# Patient Record
Sex: MALE | Race: WHITE | NOT HISPANIC OR LATINO | Employment: OTHER | URBAN - METROPOLITAN AREA
[De-identification: names, ages, dates, MRNs, and addresses within clinical notes are randomized per-mention and may not be internally consistent; named-entity substitution may affect disease eponyms.]

---

## 2017-11-29 ENCOUNTER — APPOINTMENT (OUTPATIENT)
Dept: RADIOLOGY | Facility: CLINIC | Age: 62
End: 2017-11-29
Payer: COMMERCIAL

## 2017-11-29 ENCOUNTER — ALLSCRIPTS OFFICE VISIT (OUTPATIENT)
Dept: OTHER | Facility: OTHER | Age: 62
End: 2017-11-29

## 2017-11-29 DIAGNOSIS — M25.569 PAIN IN KNEE: ICD-10-CM

## 2017-11-29 PROCEDURE — 73564 X-RAY EXAM KNEE 4 OR MORE: CPT

## 2017-11-30 NOTE — PROGRESS NOTES
Assessment    1  Lateral epicondylitis of both elbows (726 32) (M77 11,M77 12)   2  Pes anserine bursitis (726 61) (M70 50)    Plan  Knee pain    · * XR KNEE 4+ VW LEFT INJURY; Status:Active - Retrospective Authorization; Requestedfor:29Nov2017;    · * XR KNEE 4+ VW RIGHT INJURY; Status:Active - Retrospective By ProtocolAuthorization; Requested for:29Nov2017;   Lateral epicondylitis of both elbows    · Tennis elbow strap; Status:Complete;   Done: [id number]   · [facility] Instruction Sheet Tennis Elbow/Lateral Epicondylitis; Status:Complete;   Done:29Nov2017  Pes anserine bursitis    · Betamethasone Sod Phos & Acet 6 (3-3) MG/ML Injection Suspension   · Betamethasone Sod Phos & Acet 6 (3-3) MG/ML Injection Suspension   · Joint Bursa Aspiration &/or Injection Major - POC; Status:Complete;   Done: [id number]   · Joint Bursa Aspiration &/or Injection Major - POC; Status:Complete;   Done: [id number]   · Follow-up PRN Evaluation and Treatment  Follow-up  Status: Complete  Done:29Nov2017   · Apply an ice pack 4 times a day for 20 minutes the first 2 days ; Status:Complete;   Done:29Nov2017   · We have prescribed standing hamstring stretches  Hold each stretch for 30 seconds   Repeat each stretch 2 times and do them twice a day ; Status:Complete;   Done:29Nov2017   · What is a corticosteroid?; Status:Complete;   Done: 14SWG4316    Discussion/Summary    60-year-old male with bilateral anserine bursitis, some mild underlying osteoarthritis that is fairly asymptomatic, and bilateral lateral epicondylitis  I discussed with him all of these issues and we reviewed the x-rays  I recommended cortisone injections in the anserine bursa as today  Please refer to the procedure note  For the tendinitis he was given instructions on stretching as well as tennis elbow straps  If he continues to have issues with this we will get him set up with physical therapy  I will see him back as needed    documentation was recorded using voice recognition software  Chief Complaint  Bilateral knee and elbow pain      History of Present Illness  HPI: 66-year-old male here primarily for his bilateral knees, but he does also have bilateral elbow pain  The right knee bothers him much more than the left knee  He points to the proximal medial tibia as to where the pain is the worst  He has had chronic pain in his knees for years  The left knee had a partial meniscectomy done by Dr Dariel Mendoza many years ago  His pain is really dramatically increased over the last several weeks  He has a private pet grooming business and has recently started going from grooming small animals to larger animals  He has pain going up and down steps  He has had bilateral Synvisc injections in both of his knees in the past which have given him significant relief  He has tried icing it did not feel like it helped him very much  He notes significant amount of weight gain over the last year  medical intake form was reviewed      Review of Systems   Constitutional: No fever or chills, feels well, no tiredness, no recent weight loss or weight gain  Eyes: No complaints of red eyes, no eyesight problems  ENT: no complaints of loss of hearing, no nosebleeds, no sore throat  Cardiovascular: No complaints of chest pain, no palpitations, no leg claudication or lower extremity edema  Respiratory: No complaints of shortness of breath, no wheezing, no cough  Gastrointestinal: No complaints of abdominal pain, no constipation, no nausea or vomiting, no diarrhea or bloody stools  Genitourinary: No complaints of dysuria or incontinence, no hesitancy, no nocturia  Musculoskeletal: as noted in HPI  Integumentary: No complaints of skin rash or lesion, no itching or dry skin, no skin wounds  Neurological: No complaints of headache, no confusion, no numbness or tingling, no dizziness  Psychiatric: No suicidal thoughts, no anxiety, no depression    Endocrine: No muscle weakness, no frequent urination, no excessive thirst, no feelings of weakness  ROS reviewed  Active Problems  1  Knee pain (764 46) (P65 679)    Past Medical History    The active problems and past medical history were reviewed and updated today  Surgical History    The surgical history was reviewed and updated today  Family History    The family history was reviewed and updated today  Social History   · Never a smoker  The social history was reviewed and updated today  Current Meds    The medication list was reviewed and updated today  Allergies  1  No Known Drug Allergies    Vitals  Signs     Heart Rate: 89  Systolic: 118  Diastolic: 86  Height: 5 ft 8 in  Weight: 340 lb   BMI Calculated: 51 7  BSA Calculated: 2 57    Physical Exam    Right Elbow: Appearance: Normal  Tenderness: lateral epicondyle  Left Elbow: Appearance: Normal  Tenderness: lateral epicondyle  Right Knee: Appearance: Normal  Tenderness: pes anserine bursa  Palpatory findings include crepitus  ROM: Full  Motor: Normal  Special Test: no laxity on Valgus Stress-- and-- no laxity on Varus Stress  Left Knee: Appearance: Normal  Tenderness: pes anserine bursa-- and-- medial joint line  Palpatory findings include crepitus  ROM: Full  Motor: Normal  Special Test: no laxity on Valgus Stress-- and-- no laxity on Varus Stress  Constitutional - General appearance: Abnormal  overweight  Musculoskeletal - Gait and station: Normal -- Lower extremity compartments: Normal   Cardiovascular - Pulses: Normal   Skin - Skin and subcutaneous tissue: Normal   Neurologic - Sensation: Normal -- Lower extremity peripheral neuro exam: Normal   Psychiatric - Orientation to person, place, and time: Normal -- Mood and affect: Normal       Results/Data  I personally reviewed the films/images/results in the office today  My interpretation follows    X-ray Review X-rays of the bilateral knees show diffuse osteoarthritis, complete medial joint space loss on the left knee and moderate joint space loss on the right knee  Procedure    Procedure: Injection of bilateral pes anserine bursa  Indication:  Inflammation  Potential complications include bleeding-- and-- infection  Risk and benefits were discussed with the patient  Verbal consent was obtained prior to the procedure  Betadine was used to prep the area  ethyl chloride spray was used as a topical anesthetic  A 22-gauge was used to inject 7 mL of 1% Lidocaine-- and-- 1 mL of 6mg/mL betamethasone  A bandage was applied  the patient tolerated the procedure well  Complications: none  Follow-up in the office as needed        Signatures   Electronically signed by : Chung Brown MD; Nov 29 2017 12:07PM EST                       (Author)

## 2018-01-14 VITALS
DIASTOLIC BLOOD PRESSURE: 86 MMHG | HEIGHT: 68 IN | WEIGHT: 315 LBS | HEART RATE: 89 BPM | SYSTOLIC BLOOD PRESSURE: 150 MMHG | BODY MASS INDEX: 47.74 KG/M2

## 2018-05-20 ENCOUNTER — OFFICE VISIT (OUTPATIENT)
Dept: URGENT CARE | Facility: CLINIC | Age: 63
End: 2018-05-20
Payer: COMMERCIAL

## 2018-05-20 VITALS
RESPIRATION RATE: 16 BRPM | SYSTOLIC BLOOD PRESSURE: 204 MMHG | WEIGHT: 315 LBS | TEMPERATURE: 99.5 F | DIASTOLIC BLOOD PRESSURE: 90 MMHG | HEIGHT: 68 IN | BODY MASS INDEX: 47.74 KG/M2 | HEART RATE: 69 BPM | OXYGEN SATURATION: 97 %

## 2018-05-20 DIAGNOSIS — M17.11 PRIMARY OSTEOARTHRITIS OF RIGHT KNEE: ICD-10-CM

## 2018-05-20 DIAGNOSIS — M25.561 CHRONIC PAIN OF RIGHT KNEE: Primary | ICD-10-CM

## 2018-05-20 DIAGNOSIS — G89.29 CHRONIC PAIN OF RIGHT KNEE: Primary | ICD-10-CM

## 2018-05-20 PROCEDURE — 99203 OFFICE O/P NEW LOW 30 MIN: CPT | Performed by: FAMILY MEDICINE

## 2018-05-20 RX ORDER — ASPIRIN 81 MG/1
81 TABLET, CHEWABLE ORAL
COMMUNITY

## 2018-05-20 RX ORDER — VALSARTAN AND HYDROCHLOROTHIAZIDE 160; 12.5 MG/1; MG/1
1 TABLET, FILM COATED ORAL
COMMUNITY
End: 2019-10-31 | Stop reason: ALTCHOICE

## 2018-05-20 RX ORDER — SAW/PYGEUM/BETA/HERB/D3/B6/ZN 30 MG-25MG
15 CAPSULE ORAL
COMMUNITY

## 2018-05-20 RX ORDER — HYDROCODONE BITARTRATE AND ACETAMINOPHEN 5; 325 MG/1; MG/1
1 TABLET ORAL 2 TIMES DAILY
Qty: 10 TABLET | Refills: 0 | Status: SHIPPED | OUTPATIENT
Start: 2018-05-20 | End: 2019-10-31 | Stop reason: ALTCHOICE

## 2018-05-20 RX ORDER — ASCORBIC ACID 100 MG
1000 TABLET,CHEWABLE ORAL
COMMUNITY
End: 2019-10-31 | Stop reason: ALTCHOICE

## 2018-05-20 RX ORDER — AMLODIPINE BESYLATE 10 MG/1
10 TABLET ORAL
COMMUNITY

## 2018-05-20 RX ORDER — ACETAMINOPHEN 500 MG
1000 TABLET ORAL EVERY 6 HOURS
COMMUNITY

## 2018-05-20 RX ORDER — ACETAMINOPHEN, ASPIRIN AND CAFFEINE 250; 250; 65 MG/1; MG/1; MG/1
2 TABLET, FILM COATED ORAL
COMMUNITY

## 2018-05-20 NOTE — PATIENT INSTRUCTIONS
Prescription for ten tablets of Vicodin provided until patient can have appointment with Orthopedics  Patient intends to take the day off tomorrow to hopefully have an appointment

## 2018-05-20 NOTE — PROGRESS NOTES
[facility]        NAME: Bhanu Alvarez is a 61 y o  male  : 1955    MRN: [id number]  DATE: May 20, 2018  TIME: 5:58 PM    Assessment and Plan   Chronic pain of right knee [M25 561, G89 29]  1  Chronic pain of right knee     2  Primary osteoarthritis of right knee           Patient Instructions       Follow up with PCP in 3-5 days  Proceed to  ER if symptoms worsen  Chief Complaint     Chief Complaint   Patient presents with    Knee Pain     Seen a few weeks ago and received cortisone shot in right knee  9/10 + pain   Tried creams, lotion, ice, heat, OTC with no relief  Not able to sleep  History of Present Illness       Patient has a history of chronic right knee pain for which he has been receiving Synvisc injections for moderate to severe osteoarthritis of the medial compartment  Two weeks ago he received a steroid injection in the knee and states he has had pain the entire time acutely worse the past few days  No known inciting event or injury  He is in and out of a [employer] all day with work, he does not recall twisting his knee or any known injury  He has scribe the pain as sharp predominantly in the medial compartment of the knee he is having difficulty with flexion and extension and has not been able to sleep due to pain  He is unable to take NSAIDs due to past history of gastric bypass  He intends to make an appointment with Orthopedics tomorrow  Review of Systems   Review of Systems   Constitutional: Negative  Musculoskeletal: Positive for arthralgias and gait problem           Current Medications       Current Outpatient Prescriptions:     acetaminophen (TYLENOL) 500 mg tablet, Take 1,000 mg by mouth every 6 (six) hours, Disp: , Rfl:     amLODIPine (NORVASC) 10 mg tablet, Take 10 mg by mouth, Disp: , Rfl:     Ascorbic Acid (VITAMIN C) 100 MG CHEW, Chew 1,000 mg, Disp: , Rfl:     aspirin (ASPIRIN 81) 81 mg chewable tablet, Chew 81 mg, Disp: , Rfl:    aspirin-acetaminophen-caffeine (EXCEDRIN MIGRAINE) 250-250-65 MG per tablet, Take 2 tablets by mouth, Disp: , Rfl:     B Complex Vitamins (VITAMIN B COMPLEX PO), Take 1 capsule by mouth, Disp: , Rfl:     Lactobacillus (ACIDOPHILUS PO), Take 1 tablet by mouth, Disp: , Rfl:     Magnesium 100 MG CAPS, Take 500 mg by mouth, Disp: , Rfl:     Melatonin ER 10 MG TBCR, Take 15 mg by mouth, Disp: , Rfl:     Omega-3 Fatty Acids (FISH OIL PO), Take 1 g by mouth, Disp: , Rfl:     valsartan-hydrochlorothiazide (DIOVAN-HCT) 160-12 5 MG per tablet, Take 1 tablet by mouth, Disp: , Rfl:     Current Allergies     Allergies as of 05/20/2018 - Reviewed 05/20/2018   Allergen Reaction Noted    Butalbital-aspirin-caffeine Other (See Comments) 08/13/2008    Zolpidem Other (See Comments) 12/13/2012            The following portions of the patient's history were reviewed and updated as appropriate: allergies, current medications, past family history, past medical history, past social history, past surgical history and problem list      No past medical history on file  No past surgical history on file  No family history on file  Medications have been verified  Objective   BP (!) 204/90   Pulse 69   Temp 99 5 °F (37 5 °C)   Resp 16   Ht 5' 8" (1 727 m)   Wt (!) 158 kg (349 lb)   SpO2 97%   BMI 53 07 kg/m²        Physical Exam     Physical Exam   Constitutional: No distress  Musculoskeletal: He exhibits tenderness  Tender to palpation right knee medial joint line pain with flexion and extension which is reduced by 70 percent in both planes    Pain with valgus stress unable to assess Lachman's and Franck's due to pain

## 2018-05-22 VITALS
WEIGHT: 315 LBS | SYSTOLIC BLOOD PRESSURE: 131 MMHG | BODY MASS INDEX: 47.74 KG/M2 | HEART RATE: 62 BPM | HEIGHT: 68 IN | DIASTOLIC BLOOD PRESSURE: 68 MMHG

## 2018-05-22 DIAGNOSIS — M70.51 PES ANSERINUS BURSITIS OF RIGHT KNEE: Primary | ICD-10-CM

## 2018-05-22 PROCEDURE — 20610 DRAIN/INJ JOINT/BURSA W/O US: CPT | Performed by: PHYSICIAN ASSISTANT

## 2018-05-22 PROCEDURE — 99213 OFFICE O/P EST LOW 20 MIN: CPT | Performed by: PHYSICIAN ASSISTANT

## 2018-05-22 RX ORDER — LIDOCAINE HYDROCHLORIDE 10 MG/ML
7 INJECTION, SOLUTION INFILTRATION; PERINEURAL
Status: COMPLETED | OUTPATIENT
Start: 2018-05-22 | End: 2018-05-22

## 2018-05-22 RX ORDER — BETAMETHASONE SODIUM PHOSPHATE AND BETAMETHASONE ACETATE 3; 3 MG/ML; MG/ML
6 INJECTION, SUSPENSION INTRA-ARTICULAR; INTRALESIONAL; INTRAMUSCULAR; SOFT TISSUE
Status: COMPLETED | OUTPATIENT
Start: 2018-05-22 | End: 2018-05-22

## 2018-05-22 RX ADMIN — LIDOCAINE HYDROCHLORIDE 7 ML: 10 INJECTION, SOLUTION INFILTRATION; PERINEURAL at 14:00

## 2018-05-22 RX ADMIN — BETAMETHASONE SODIUM PHOSPHATE AND BETAMETHASONE ACETATE 6 MG: 3; 3 INJECTION, SUSPENSION INTRA-ARTICULAR; INTRALESIONAL; INTRAMUSCULAR; SOFT TISSUE at 14:00

## 2018-05-22 NOTE — ASSESSMENT & PLAN NOTE
Findings and treatment options were discussed the patient  Repeat cortisone injection was given to the right pes bursa today  Patient tolerated the procedure well  Advised to apply cold compress today  Continue NSAIDs as needed for pain  He is given a prescription for physical therapy in case he feels no improvement after the next 3-4 weeks  Follow-up as needed if symptoms return

## 2018-05-22 NOTE — PROGRESS NOTES
Assessment:     1  Pes anserinus bursitis of right knee        Plan:  The patient was seen and examined by Dr Gerry Hollingsworth and myself  Problem List Items Addressed This Visit        Musculoskeletal and Integument    Pes anserinus bursitis of right knee - Primary     Findings and treatment options were discussed the patient  Repeat cortisone injection was given to the right pes bursa today  Patient tolerated the procedure well  Advised to apply cold compress today  Continue NSAIDs as needed for pain  He is given a prescription for physical therapy in case he feels no improvement after the next 3-4 weeks  Follow-up as needed if symptoms return  Relevant Orders    Ambulatory referral to Physical Therapy         Subjective:     Patient ID: Michela Warner is a 61 y o  male  Chief Complaint: This is a 77-year-old male following up for right knee pes anserine bursitis  He had cortisone injections to the bilateral pes bursa as in November 2017  The pain resolved on the left side but he did not feel any relief on the right  He continues to have the same pain in the same location  He has increased pain with prolonged walking  He has been icing and taking anti-inflammatories with no relief as well  No other recent treatment  Allergy:  Allergies   Allergen Reactions    Butalbital-Aspirin-Caffeine Other (See Comments)     coma    Zolpidem Other (See Comments)     became [place]     Medications:  all current active meds have been reviewed  Past Medical History:  History reviewed  No pertinent past medical history  Past Surgical History:  History reviewed  No pertinent surgical history    Family History:  Family History   Problem Relation Age of Onset    No Known Problems Mother     No Known Problems Father      Social History:  History   Alcohol use Not on file     History   Drug use: Unknown     History   Smoking Status    Never Smoker   Smokeless Tobacco    Never Used     Review of Systems Constitutional: Negative  HENT: Negative  Eyes: Negative  Respiratory: Negative  Cardiovascular: Negative  Gastrointestinal: Negative  Endocrine: Negative  Genitourinary: Negative  Musculoskeletal: Positive for arthralgias and myalgias  Skin: Negative  Allergic/Immunologic: Negative  Neurological: Negative  Hematological: Negative  Psychiatric/Behavioral: Negative  Objective:  BP Readings from Last 1 Encounters:   05/22/18 131/68      Wt Readings from Last 1 Encounters:   05/22/18 (!) 158 kg (349 lb)      BMI:   Estimated body mass index is 53 07 kg/m² as calculated from the following:    Height as of this encounter: 5' 8" (1 727 m)  Weight as of this encounter: 158 kg (349 lb)  BSA:   Estimated body surface area is 2 59 meters squared as calculated from the following:    Height as of this encounter: 5' 8" (1 727 m)  Weight as of this encounter: 158 kg (349 lb)  Physical Exam   Constitutional: He is oriented to person, place, and time  He appears well-developed  HENT:   Head: Normocephalic and atraumatic  Eyes: EOM are normal  Pupils are equal, round, and reactive to light  Neck: Neck supple  Musculoskeletal:        Right knee: He exhibits no effusion  Neurological: He is alert and oriented to person, place, and time  Skin: Skin is warm  Psychiatric: He has a normal mood and affect  Nursing note and vitals reviewed  Right Knee Exam     Tenderness   The patient is experiencing tenderness in the pes anserinus      Range of Motion   Extension: -10   Flexion: normal     Tests   Varus: negative  Valgus: negative    Other   Erythema: absent  Sensation: normal  Pulse: present  Swelling: mild  Other tests: no effusion present      Left Knee Exam   Left knee exam is normal               Large joint arthrocentesis  Date/Time: 5/22/2018 2:00 PM  Consent given by: patient  Site marked: site marked  Timeout: Immediately prior to procedure a time out was called to verify the correct patient, procedure, equipment, support staff and site/side marked as required   Supporting Documentation  Indications: pain   Procedure Details  Location: knee - R knee (pes bursa)  Needle size: 22 G  Ultrasound guidance: no  Medications administered: 7 mL lidocaine 1 %; 6 mg betamethasone acetate-betamethasone sodium phosphate 6 (3-3) mg/mL    Patient tolerance: patient tolerated the procedure well with no immediate complications  Dressing:  Sterile dressing applied

## 2018-07-02 ENCOUNTER — TRANSCRIBE ORDERS (OUTPATIENT)
Dept: ADMINISTRATIVE | Facility: HOSPITAL | Age: 63
End: 2018-07-02

## 2018-07-02 ENCOUNTER — HOSPITAL ENCOUNTER (OUTPATIENT)
Dept: RADIOLOGY | Facility: HOSPITAL | Age: 63
Discharge: HOME | End: 2018-07-02
Payer: COMMERCIAL

## 2018-07-02 DIAGNOSIS — M25.561 ACUTE PAIN OF RIGHT KNEE: Primary | ICD-10-CM

## 2018-07-02 DIAGNOSIS — M79.601 RIGHT UPPER LIMB PAIN: ICD-10-CM

## 2018-07-02 DIAGNOSIS — M25.561 ACUTE PAIN OF RIGHT KNEE: ICD-10-CM

## 2018-07-02 PROCEDURE — 73562 X-RAY EXAM OF KNEE 3: CPT

## 2018-07-02 PROCEDURE — 73060 X-RAY EXAM OF HUMERUS: CPT

## 2018-07-10 ENCOUNTER — ANESTHESIA EVENT (OUTPATIENT)
Dept: GASTROENTEROLOGY | Facility: HOSPITAL | Age: 63
End: 2018-07-10
Payer: COMMERCIAL

## 2018-07-11 ENCOUNTER — ANESTHESIA (OUTPATIENT)
Dept: GASTROENTEROLOGY | Facility: HOSPITAL | Age: 63
End: 2018-07-11
Payer: COMMERCIAL

## 2018-07-11 ENCOUNTER — HOSPITAL ENCOUNTER (OUTPATIENT)
Facility: HOSPITAL | Age: 63
Setting detail: OUTPATIENT SURGERY
Discharge: HOME | End: 2018-07-11
Attending: INTERNAL MEDICINE | Admitting: INTERNAL MEDICINE
Payer: COMMERCIAL

## 2018-07-11 VITALS
OXYGEN SATURATION: 95 % | WEIGHT: 315 LBS | HEART RATE: 67 BPM | DIASTOLIC BLOOD PRESSURE: 62 MMHG | HEIGHT: 67 IN | SYSTOLIC BLOOD PRESSURE: 144 MMHG | RESPIRATION RATE: 16 BRPM | TEMPERATURE: 98.7 F | BODY MASS INDEX: 49.44 KG/M2

## 2018-07-11 RX ORDER — LIDOCAINE HYDROCHLORIDE 10 MG/ML
INJECTION, SOLUTION EPIDURAL; INFILTRATION; INTRACAUDAL; PERINEURAL AS NEEDED
Status: DISCONTINUED | OUTPATIENT
Start: 2018-07-11 | End: 2018-07-11 | Stop reason: SURG

## 2018-07-11 RX ORDER — PROPOFOL 10 MG/ML
INJECTION, EMULSION INTRAVENOUS CONTINUOUS PRN
Status: DISCONTINUED | OUTPATIENT
Start: 2018-07-11 | End: 2018-07-11 | Stop reason: SURG

## 2018-07-11 RX ORDER — PROPOFOL 10 MG/ML
INJECTION, EMULSION INTRAVENOUS AS NEEDED
Status: DISCONTINUED | OUTPATIENT
Start: 2018-07-11 | End: 2018-07-11 | Stop reason: SURG

## 2018-07-11 RX ORDER — SODIUM CHLORIDE 9 MG/ML
INJECTION, SOLUTION INTRAVENOUS CONTINUOUS PRN
Status: DISCONTINUED | OUTPATIENT
Start: 2018-07-11 | End: 2018-07-11 | Stop reason: SURG

## 2018-07-11 RX ADMIN — PROPOFOL 50 MG: 10 INJECTION, EMULSION INTRAVENOUS at 12:58

## 2018-07-11 RX ADMIN — PROPOFOL 30 MG: 10 INJECTION, EMULSION INTRAVENOUS at 12:56

## 2018-07-11 RX ADMIN — LIDOCAINE HYDROCHLORIDE 100 MG: 10 INJECTION, SOLUTION EPIDURAL; INFILTRATION; INTRACAUDAL; PERINEURAL at 12:54

## 2018-07-11 RX ADMIN — SODIUM CHLORIDE: 0.9 INJECTION, SOLUTION INTRAVENOUS at 12:50

## 2018-07-11 RX ADMIN — PROPOFOL 70 MG: 10 INJECTION, EMULSION INTRAVENOUS at 12:54

## 2018-07-11 RX ADMIN — PROPOFOL 80 MCG/KG/MIN: 10 INJECTION, EMULSION INTRAVENOUS at 12:54

## 2018-07-11 NOTE — ANESTHESIA POSTPROCEDURE EVALUATION
Post-Op Assessment Note      CV Status:  Stable    Mental Status:  Alert and awake    Hydration Status:  Euvolemic    PONV Controlled:  Controlled    Airway Patency:  Patent    Post Op Vitals Reviewed: Yes          Staff: Anesthesiologist, CRNA           /75 (07/11/18 1309)    Temp      Pulse 72 (07/11/18 1309)   Resp 16 (07/11/18 1309)    SpO2 94 % (07/11/18 1309)

## 2018-07-11 NOTE — OP NOTE
**** GI/ENDOSCOPY REPORT ****     PATIENT NAME: DARIA SAUCEDO - VISIT ID:     INTRODUCTION: Esophagogastroduodenoscopy - A 61 male patient presents for   an outpatient Esophagogastroduodenoscopy at [place] [address]  INDICATIONS: Iron deficiency anemia  CONSENT: The benefits, risks, and alternatives to the procedure were   discussed and informed consent was obtained from the patient  PREPARATION:  EKG, pulse, pulse oximetry and blood pressure were monitored   throughout the procedure  MEDICATIONS: Anesthesia-check records     PROCEDURE:  The endoscope was passed with ease under direct visualization   and advanced to the efferent jejunal loop  The scope was withdrawn and the   mucosa was carefully examined  The views were excellent  The patient's   toleration of the procedure was excellent  FINDINGS:   Esophagus: The esophagus appeared to be normal  There was no   evidence of Zhang's esophagus or esophagitis in the esophagus  Stomach: There was evidence of prior gastric bypass with Pavan-en-Y in the stomach  The gastric remnant was normal without ulcers, gastritis or   angiodysplasia  The anastomosis was normal     Jejunum: The jejunum   appeared to be normal      COMPLICATIONS: There were no complications  IMPRESSIONS: Normal esophagus  No evidence of Zhang's esophagus and   esophagitis in the esophagus  Evidence of prior intervention in the   stomach  The gastric remnant was normal without ulcers, gastritis or   angiodysplasia  The anastomosis was normal  Normal jejunum  ESTIMATED BLOOD LOSS:     RECOMMENDATIONS: Call Dr Govind Gutierrez [phone] with questions or   problems  Follow-up appointment in attending physician's office Dr Govind Gutierrez [phone] on an as needed basis  Follo with iron infusions   as scheduled       PROCEDURE CODES: 34306 - EGD flexible; incl brushing or washing     ICD-9 Codes: 280 9 Iron deficiency anemia, unspecified ICD-10 Codes: D50 9 Iron deficiency anemia, unspecified     PERFORMED BY: ALYLN Heredia  on 07/11/2018  Version 1, electronically signed by ALLYN Heredia  on   07/11/2018 at 13:08

## 2018-07-11 NOTE — H&P
History and Physical - [facility]  Lawson Pedro 61 y o  male MRN: [id number]                  HPI: Lawson Pedro is a 61y o  year old male who presents for the evaluation of iron deficiency anemia  He is status post gastric bypass with Pavan-en-Y  Recent colonoscopy was negative  Has been taking nonsteroidals including Excedrin and Advil several times a day for many weeks  REVIEW OF SYSTEMS: Per the HPI, and otherwise unremarkable      Historical Information   Past Medical History:   Diagnosis Date    Chronic pain     Hypertension     Iron (Fe) deficiency anemia     Kidney stone     Spinal stenosis     neck     Past Surgical History:   Procedure Laterality Date    BLADDER SURGERY      GASTRIC BYPASS      KNEE SURGERY Left     NECK SURGERY      PROSTATE SURGERY       Social History   History   Alcohol Use    Yes     Comment: rarely     History   Drug Use No     History   Smoking Status    Never Smoker   Smokeless Tobacco    Never Used     Family History   Problem Relation Age of Onset    No Known Problems Mother     No Known Problems Father        Meds/Allergies     Prescriptions Prior to Admission   Medication    acetaminophen (TYLENOL) 500 mg tablet    amLODIPine (NORVASC) 10 mg tablet    Ascorbic Acid (VITAMIN C) 100 MG CHEW    aspirin (ASPIRIN 81) 81 mg chewable tablet    aspirin-acetaminophen-caffeine (EXCEDRIN MIGRAINE) 250-250-65 MG per tablet    B Complex Vitamins (VITAMIN B COMPLEX PO)    HYDROcodone-acetaminophen (NORCO) 5-325 mg per tablet    Lactobacillus (ACIDOPHILUS PO)    Magnesium 100 MG CAPS    Melatonin ER 10 MG TBCR    Omega-3 Fatty Acids (FISH OIL PO)    valsartan-hydrochlorothiazide (DIOVAN-HCT) 160-12 5 MG per tablet       Allergies   Allergen Reactions    Butalbital-Aspirin-Caffeine Other (See Comments)     coma    Zolpidem Other (See Comments)     became [place]       Objective     Blood pressure 161/77, pulse 73, temperature 98 7 °F (37 1 °C), temperature source Oral, resp  rate 20, height 5' 7" (1 702 m), weight (!) 154 kg (340 lb), SpO2 96 %  PHYSICAL EXAM    Gen: NAD  CV: RRR  CHEST: Clear  ABD: soft, NT/ND  EXT: no edema      ASSESSMENT/PLAN:  This is a 61y o  year old male here for iron deficiency anemia      PLAN:  EGD  Procedure:

## 2018-07-11 NOTE — ANESTHESIA PREPROCEDURE EVALUATION
Review of Systems/Medical History      No history of anesthetic complications     Cardiovascular  Hypertension ,    Pulmonary  Negative pulmonary ROS        GI/Hepatic      Comment: S/p gastric bypass     Kidney stones,        Endo/Other  Negative endo/other ROS      GYN  Negative gynecology ROS          Hematology  Anemia ,     Musculoskeletal    Arthritis     Neurology  Negative neurology ROS      Psychology           Physical Exam    Airway    Mallampati score: I  TM Distance: >3 FB  Neck ROM: full     Dental       Cardiovascular      Pulmonary      Other Findings        Anesthesia Plan  ASA Score- 3     Anesthesia Type- IV sedation with anesthesia with ASA Monitors  Additional Monitors:   Airway Plan:     Comment: IV sedation,  standard ASA monitors  Risks and benefits discussed with patient; patient consented and agrees to proceed  I saw and evaluated the patient  If seen with CRNA, we have discussed the anesthetic plan and I am in agreement that the plan is appropriate for the patient        Plan Factors-    Induction- intravenous  Postoperative Plan-     Informed Consent- Anesthetic plan and risks discussed with patient  I personally reviewed this patient with the CRNA  Discussed and agreed on the Anesthesia Plan with the CRNA  Sravanthi May

## 2018-07-13 ENCOUNTER — LAB REQUISITION (OUTPATIENT)
Dept: LAB | Facility: HOSPITAL | Age: 63
End: 2018-07-13
Payer: COMMERCIAL

## 2018-07-13 DIAGNOSIS — D50.8 OTHER IRON DEFICIENCY ANEMIAS: ICD-10-CM

## 2018-07-13 DIAGNOSIS — M43.02 SPONDYLOLYSIS OF CERVICAL REGION: ICD-10-CM

## 2018-07-13 LAB
BACTERIA UR QL AUTO: ABNORMAL /HPF
BILIRUB UR QL STRIP: NEGATIVE
CLARITY UR: CLEAR
COLOR UR: ABNORMAL
GLUCOSE UR STRIP-MCNC: NEGATIVE MG/DL
HGB UR QL STRIP.AUTO: NEGATIVE
HYALINE CASTS #/AREA URNS LPF: ABNORMAL /LPF
KETONES UR STRIP-MCNC: NEGATIVE MG/DL
LEUKOCYTE ESTERASE UR QL STRIP: NEGATIVE
NITRITE UR QL STRIP: NEGATIVE
NON-SQ EPI CELLS URNS QL MICRO: ABNORMAL /HPF
PH UR STRIP.AUTO: 5 [PH] (ref 4.5–8)
PROT UR STRIP-MCNC: ABNORMAL MG/DL
RBC #/AREA URNS AUTO: ABNORMAL /HPF
SP GR UR STRIP.AUTO: 1.02 (ref 1–1.03)
UROBILINOGEN UR QL STRIP.AUTO: 0.2 E.U./DL
WBC #/AREA URNS AUTO: ABNORMAL /HPF

## 2018-07-13 PROCEDURE — 81001 URINALYSIS AUTO W/SCOPE: CPT | Performed by: NURSE PRACTITIONER

## 2018-08-27 ENCOUNTER — LAB REQUISITION (OUTPATIENT)
Dept: LAB | Facility: HOSPITAL | Age: 63
End: 2018-08-27
Payer: COMMERCIAL

## 2018-08-27 DIAGNOSIS — D50.8 OTHER IRON DEFICIENCY ANEMIAS: ICD-10-CM

## 2018-08-27 LAB
TIBC SERPL-MCNC: 315 UG/DL (ref 250–450)
TRANSFERRIN SERPL-MCNC: 254 MG/DL (ref 200–400)

## 2018-08-27 PROCEDURE — 83550 IRON BINDING TEST: CPT | Performed by: INTERNAL MEDICINE

## 2018-08-27 PROCEDURE — 83520 IMMUNOASSAY QUANT NOS NONAB: CPT | Performed by: INTERNAL MEDICINE

## 2018-08-27 PROCEDURE — 84466 ASSAY OF TRANSFERRIN: CPT | Performed by: INTERNAL MEDICINE

## 2018-08-29 LAB — STFR SERPL-SCNC: 28.6 NMOL/L (ref 12.2–27.3)

## 2019-04-16 ENCOUNTER — TELEPHONE (OUTPATIENT)
Dept: GASTROENTEROLOGY | Facility: CLINIC | Age: 64
End: 2019-04-16

## 2019-08-26 ENCOUNTER — TRANSCRIBE ORDERS (OUTPATIENT)
Dept: ADMINISTRATIVE | Facility: HOSPITAL | Age: 64
End: 2019-08-26

## 2019-08-26 DIAGNOSIS — H53.9 UNSPECIFIED VISUAL DISTURBANCE: Primary | ICD-10-CM

## 2019-08-27 ENCOUNTER — HOSPITAL ENCOUNTER (OUTPATIENT)
Dept: ULTRASOUND IMAGING | Facility: CLINIC | Age: 64
Discharge: HOME | End: 2019-08-27
Payer: COMMERCIAL

## 2019-08-27 DIAGNOSIS — H53.9 UNSPECIFIED VISUAL DISTURBANCE: ICD-10-CM

## 2019-08-27 PROCEDURE — 93880 EXTRACRANIAL BILAT STUDY: CPT | Performed by: SURGERY

## 2019-08-27 PROCEDURE — 93880 EXTRACRANIAL BILAT STUDY: CPT

## 2019-10-31 ENCOUNTER — ANESTHESIA (OUTPATIENT)
Dept: GASTROENTEROLOGY | Facility: HOSPITAL | Age: 64
End: 2019-10-31

## 2019-10-31 ENCOUNTER — HOSPITAL ENCOUNTER (OUTPATIENT)
Dept: GASTROENTEROLOGY | Facility: HOSPITAL | Age: 64
Setting detail: OUTPATIENT SURGERY
Discharge: HOME | End: 2019-10-31
Attending: INTERNAL MEDICINE | Admitting: INTERNAL MEDICINE
Payer: COMMERCIAL

## 2019-10-31 ENCOUNTER — ANESTHESIA EVENT (OUTPATIENT)
Dept: GASTROENTEROLOGY | Facility: HOSPITAL | Age: 64
End: 2019-10-31

## 2019-10-31 VITALS
WEIGHT: 295 LBS | SYSTOLIC BLOOD PRESSURE: 131 MMHG | TEMPERATURE: 97.7 F | BODY MASS INDEX: 46.3 KG/M2 | DIASTOLIC BLOOD PRESSURE: 70 MMHG | HEIGHT: 67 IN | OXYGEN SATURATION: 94 % | RESPIRATION RATE: 18 BRPM | HEART RATE: 48 BPM

## 2019-10-31 DIAGNOSIS — Z86.010 PERSONAL HISTORY OF COLONIC POLYPS: ICD-10-CM

## 2019-10-31 PROCEDURE — 88305 TISSUE EXAM BY PATHOLOGIST: CPT | Performed by: PATHOLOGY

## 2019-10-31 RX ORDER — TRAMADOL HYDROCHLORIDE 50 MG/1
50 TABLET ORAL EVERY 6 HOURS PRN
COMMUNITY

## 2019-10-31 RX ORDER — HYDROCHLOROTHIAZIDE 25 MG/1
25 TABLET ORAL DAILY
COMMUNITY

## 2019-10-31 RX ORDER — SODIUM CHLORIDE, SODIUM LACTATE, POTASSIUM CHLORIDE, CALCIUM CHLORIDE 600; 310; 30; 20 MG/100ML; MG/100ML; MG/100ML; MG/100ML
100 INJECTION, SOLUTION INTRAVENOUS CONTINUOUS
Status: CANCELLED | OUTPATIENT
Start: 2019-10-31

## 2019-10-31 RX ORDER — LOSARTAN POTASSIUM 100 MG/1
100 TABLET ORAL DAILY
COMMUNITY

## 2019-10-31 RX ORDER — ERGOCALCIFEROL 1.25 MG/1
50000 CAPSULE ORAL WEEKLY
COMMUNITY

## 2019-10-31 RX ORDER — SODIUM CHLORIDE 9 MG/ML
125 INJECTION, SOLUTION INTRAVENOUS CONTINUOUS
Status: DISCONTINUED | OUTPATIENT
Start: 2019-10-31 | End: 2019-11-04 | Stop reason: HOSPADM

## 2019-10-31 RX ORDER — PROPOFOL 10 MG/ML
INJECTION, EMULSION INTRAVENOUS AS NEEDED
Status: DISCONTINUED | OUTPATIENT
Start: 2019-10-31 | End: 2019-10-31 | Stop reason: SURG

## 2019-10-31 RX ORDER — PROPOFOL 10 MG/ML
INJECTION, EMULSION INTRAVENOUS CONTINUOUS PRN
Status: DISCONTINUED | OUTPATIENT
Start: 2019-10-31 | End: 2019-10-31 | Stop reason: SURG

## 2019-10-31 RX ADMIN — PROPOFOL 100 MCG/KG/MIN: 10 INJECTION, EMULSION INTRAVENOUS at 09:59

## 2019-10-31 RX ADMIN — SODIUM CHLORIDE 125 ML/HR: 0.9 INJECTION, SOLUTION INTRAVENOUS at 08:51

## 2019-10-31 RX ADMIN — PROPOFOL 100 MG: 10 INJECTION, EMULSION INTRAVENOUS at 09:59

## 2019-10-31 NOTE — H&P
History and Physical - [facility]  Tim Meade 59 y o  male MRN: [id number]                  HPI: Tim Meade is a 59y o  year old male who presents for surveillance colonoscopy for colonic polyps  The most recent colonoscopy was about 5 years ago  REVIEW OF SYSTEMS: Per the HPI, and otherwise unremarkable  Historical Information   Past Medical History:   Diagnosis Date    Chronic pain     Hypertension     Iron (Fe) deficiency anemia     Kidney stone     Spinal stenosis     neck    Stroke [facility]) 2010     Past Surgical History:   Procedure Laterality Date    BLADDER SURGERY      COLONOSCOPY      ESOPHAGOGASTRODUODENOSCOPY N/A 7/11/2018    Procedure: ESOPHAGOGASTRODUODENOSCOPY (EGD); Surgeon: Delmar Cervantes MD;  Location: [facility]; Service: Gastroenterology    GASTRIC BYPASS      KNEE SURGERY Left     NECK SURGERY      PROSTATE SURGERY       Social History   Social History     Substance and Sexual Activity   Alcohol Use Not Currently    Comment: rarely     Social History     Substance and Sexual Activity   Drug Use No     Social History     Tobacco Use   Smoking Status Never Smoker   Smokeless Tobacco Never Used     Family History   Problem Relation Age of Onset    No Known Problems Mother     No Known Problems Father        Meds/Allergies       (Not in a hospital admission)    Allergies   Allergen Reactions    Butalbital-Aspirin-Caffeine Other (See Comments)     coma    Zolpidem Other (See Comments)     became [place]       Objective     BP (!) 178/87   Pulse 58   Temp 97 7 °F (36 5 °C) (Tympanic)   Resp 18   Ht 5' 7" (1 702 m)   Wt 134 kg (295 lb)   SpO2 96%   BMI 46 20 kg/m²       PHYSICAL EXAM    Gen: NAD  CV: RRR  CHEST: Clear  ABD: soft, NT/ND  EXT: no edema      ASSESSMENT/PLAN:  This is a 59y o  year old male here for colonoscopy, and he is stable and optimized for his procedure

## 2019-10-31 NOTE — ANESTHESIA PREPROCEDURE EVALUATION
H/o colon polyps    Review of Systems/Medical History  Patient summary reviewed    No history of anesthetic complications     Cardiovascular  EKG reviewed, Exercise tolerance (METS): >4,  Hypertension ,    Pulmonary  Negative pulmonary ROS        GI/Hepatic  Negative GI/hepatic ROS          Kidney stones,        Endo/Other    Obesity  super morbid obesity   GYN       Hematology  Anemia iron deficiency anemia,     Musculoskeletal    Comment: Spinal stenosis Arthritis     Neurology  Negative neurology ROS      Psychology   Negative psychology ROS              Physical Exam    Airway    Mallampati score: II  TM Distance: >3 FB  Neck ROM: full     Dental   No notable dental hx     Cardiovascular  Rhythm: regular, Rate: normal,     Pulmonary  Breath sounds clear to auscultation,     Other Findings        Anesthesia Plan  ASA Score- 3     Anesthesia Type- IV sedation with anesthesia with ASA Monitors  Additional Monitors:   Airway Plan:     Comment: Per patient, appropriately NPO, denies active CP/SOB/wheezing/symptoms related to heartburn/nausea/vomiting        Plan Factors-    Induction- intravenous  Postoperative Plan-     Informed Consent- Anesthetic plan and risks discussed with patient  I personally reviewed this patient with the CRNA  Discussed and agreed on the Anesthesia Plan with the CRNA  Chalino Boyce

## 2019-10-31 NOTE — ANESTHESIA POSTPROCEDURE EVALUATION
Post-Op Assessment Note    CV Status:  Stable    Pain management: adequate     Mental Status:  Alert and awake   Hydration Status:  Euvolemic   PONV Controlled:  Controlled   Airway Patency:  Patent   Post Op Vitals Reviewed: Yes      Staff: Anesthesiologist, CRNA           /60 (10/31/19 1035)    Temp      Pulse (!) 48 (10/31/19 1035)   Resp 18 (10/31/19 1035)    SpO2 93 % (10/31/19 1035)

## 2024-04-01 ENCOUNTER — TELEPHONE (OUTPATIENT)
Dept: OTHER | Facility: OTHER | Age: 69
End: 2024-04-01

## 2024-04-01 ENCOUNTER — APPOINTMENT (EMERGENCY)
Dept: CT IMAGING | Facility: HOSPITAL | Age: 69
End: 2024-04-01
Payer: MEDICARE

## 2024-04-01 ENCOUNTER — TELEPHONE (OUTPATIENT)
Age: 69
End: 2024-04-01

## 2024-04-01 ENCOUNTER — NURSE TRIAGE (OUTPATIENT)
Dept: OTHER | Facility: OTHER | Age: 69
End: 2024-04-01

## 2024-04-01 ENCOUNTER — HOSPITAL ENCOUNTER (EMERGENCY)
Facility: HOSPITAL | Age: 69
Discharge: HOME | End: 2024-04-01
Attending: EMERGENCY MEDICINE
Payer: MEDICARE

## 2024-04-01 VITALS
DIASTOLIC BLOOD PRESSURE: 78 MMHG | TEMPERATURE: 97.6 F | RESPIRATION RATE: 18 BRPM | HEART RATE: 71 BPM | OXYGEN SATURATION: 97 % | SYSTOLIC BLOOD PRESSURE: 147 MMHG

## 2024-04-01 DIAGNOSIS — R10.9 FLANK PAIN: Primary | ICD-10-CM

## 2024-04-01 DIAGNOSIS — N20.0 KIDNEY STONE: ICD-10-CM

## 2024-04-01 LAB
ALBUMIN SERPL BCP-MCNC: 4.2 G/DL (ref 3.5–5)
ALP SERPL-CCNC: 87 U/L (ref 34–104)
ALT SERPL W P-5'-P-CCNC: 14 U/L (ref 7–52)
ANION GAP SERPL CALCULATED.3IONS-SCNC: 10 MMOL/L (ref 4–13)
AST SERPL W P-5'-P-CCNC: 15 U/L (ref 13–39)
BACTERIA UR QL AUTO: ABNORMAL /HPF
BASOPHILS # BLD AUTO: 0.04 THOUSANDS/ÂΜL (ref 0–0.1)
BASOPHILS NFR BLD AUTO: 1 % (ref 0–1)
BILIRUB SERPL-MCNC: 0.79 MG/DL (ref 0.2–1)
BILIRUB UR QL STRIP: NEGATIVE
BUN SERPL-MCNC: 19 MG/DL (ref 5–25)
CALCIUM SERPL-MCNC: 9 MG/DL (ref 8.4–10.2)
CHLORIDE SERPL-SCNC: 102 MMOL/L (ref 96–108)
CLARITY UR: ABNORMAL
CO2 SERPL-SCNC: 25 MMOL/L (ref 21–32)
COLOR UR: YELLOW
CREAT SERPL-MCNC: 1.06 MG/DL (ref 0.6–1.3)
EOSINOPHIL # BLD AUTO: 0.02 THOUSAND/ÂΜL (ref 0–0.61)
EOSINOPHIL NFR BLD AUTO: 0 % (ref 0–6)
ERYTHROCYTE [DISTWIDTH] IN BLOOD BY AUTOMATED COUNT: 13.2 % (ref 11.6–15.1)
GFR SERPL CREATININE-BSD FRML MDRD: 71 ML/MIN/1.73SQ M
GLUCOSE SERPL-MCNC: 145 MG/DL (ref 65–140)
GLUCOSE UR STRIP-MCNC: NEGATIVE MG/DL
HCT VFR BLD AUTO: 43.6 % (ref 36.5–49.3)
HGB BLD-MCNC: 14.1 G/DL (ref 12–17)
HGB UR QL STRIP.AUTO: ABNORMAL
HOLD SPECIMEN: NORMAL
IMM GRANULOCYTES # BLD AUTO: 0.03 THOUSAND/UL (ref 0–0.2)
IMM GRANULOCYTES NFR BLD AUTO: 0 % (ref 0–2)
KETONES UR STRIP-MCNC: NEGATIVE MG/DL
LEUKOCYTE ESTERASE UR QL STRIP: NEGATIVE
LIPASE SERPL-CCNC: 10 U/L (ref 11–82)
LYMPHOCYTES # BLD AUTO: 0.89 THOUSANDS/ÂΜL (ref 0.6–4.47)
LYMPHOCYTES NFR BLD AUTO: 11 % (ref 14–44)
MCH RBC QN AUTO: 30.3 PG (ref 26.8–34.3)
MCHC RBC AUTO-ENTMCNC: 32.3 G/DL (ref 31.4–37.4)
MCV RBC AUTO: 94 FL (ref 82–98)
MONOCYTES # BLD AUTO: 0.38 THOUSAND/ÂΜL (ref 0.17–1.22)
MONOCYTES NFR BLD AUTO: 5 % (ref 4–12)
MUCOUS THREADS UR QL AUTO: ABNORMAL
NEUTROPHILS # BLD AUTO: 7.1 THOUSANDS/ÂΜL (ref 1.85–7.62)
NEUTS SEG NFR BLD AUTO: 83 % (ref 43–75)
NITRITE UR QL STRIP: NEGATIVE
NON-SQ EPI CELLS URNS QL MICRO: ABNORMAL /HPF
NRBC BLD AUTO-RTO: 0 /100 WBCS
PH UR STRIP.AUTO: 5.5 [PH]
PLATELET # BLD AUTO: 230 THOUSANDS/UL (ref 149–390)
PMV BLD AUTO: 8.9 FL (ref 8.9–12.7)
POTASSIUM SERPL-SCNC: 3.8 MMOL/L (ref 3.5–5.3)
PROT SERPL-MCNC: 7 G/DL (ref 6.4–8.4)
PROT UR STRIP-MCNC: ABNORMAL MG/DL
RBC # BLD AUTO: 4.65 MILLION/UL (ref 3.88–5.62)
RBC #/AREA URNS AUTO: ABNORMAL /HPF
SODIUM SERPL-SCNC: 137 MMOL/L (ref 135–147)
SP GR UR STRIP.AUTO: 1.02 (ref 1–1.03)
UROBILINOGEN UR STRIP-ACNC: <2 MG/DL
WBC # BLD AUTO: 8.46 THOUSAND/UL (ref 4.31–10.16)
WBC #/AREA URNS AUTO: ABNORMAL /HPF

## 2024-04-01 PROCEDURE — 83690 ASSAY OF LIPASE: CPT | Performed by: EMERGENCY MEDICINE

## 2024-04-01 PROCEDURE — 81001 URINALYSIS AUTO W/SCOPE: CPT | Performed by: EMERGENCY MEDICINE

## 2024-04-01 PROCEDURE — 85025 COMPLETE CBC W/AUTO DIFF WBC: CPT | Performed by: EMERGENCY MEDICINE

## 2024-04-01 PROCEDURE — 36415 COLL VENOUS BLD VENIPUNCTURE: CPT

## 2024-04-01 PROCEDURE — 80053 COMPREHEN METABOLIC PANEL: CPT | Performed by: EMERGENCY MEDICINE

## 2024-04-01 PROCEDURE — 74176 CT ABD & PELVIS W/O CONTRAST: CPT

## 2024-04-01 PROCEDURE — 99284 EMERGENCY DEPT VISIT MOD MDM: CPT

## 2024-04-01 RX ORDER — OXYCODONE HYDROCHLORIDE 5 MG/1
5 TABLET ORAL EVERY 6 HOURS PRN
Qty: 6 TABLET | Refills: 0 | Status: SHIPPED | OUTPATIENT
Start: 2024-04-01 | End: 2024-04-11

## 2024-04-01 RX ORDER — TAMSULOSIN HYDROCHLORIDE 0.4 MG/1
0.4 CAPSULE ORAL
Qty: 14 CAPSULE | Refills: 0 | Status: SHIPPED | OUTPATIENT
Start: 2024-04-01 | End: 2024-04-03 | Stop reason: SDUPTHER

## 2024-04-01 NOTE — TELEPHONE ENCOUNTER
"Regarding: kindey stones  ----- Message from Diego Harrington sent at 4/1/2024  5:05 AM EDT -----  \" I am kidney stone pain.\"    "

## 2024-04-01 NOTE — TELEPHONE ENCOUNTER
"Reason for Disposition  • Patient sounds very sick or weak to the triager    Answer Assessment - Initial Assessment Questions  1. LOCATION: \"Where does it hurt?\" (e.g., left, right)   Patient states he is calling for Dr. Pearson office with urology for kidney stone pain. He seen him before.  Triage reviewed chart, patient is with [facility] urology, advised to call their answering service or if pain is really bad to go to ED. Patient states he will go to [facility] (formerly [facility] now    Protocols used: Flank Pain-ADULT-    "

## 2024-04-01 NOTE — TELEPHONE ENCOUNTER
New Patient    What is the reason for the patient’s appointment?:Patient called stating he is having pain and he is stated he has a history of kidney stones.  He use to see Dr. Pearson along time ago.  He is on his way to the ER and get evaluated and then he will call the office to set up appointment.     Patient   What office location does the patient prefer?: [place]     Does patient have Imaging/Lab Results:    Have patient records been requested?:  If No, are the records showing in Epic:       INSURANCE:   Do we accept the patient's insurance or is the patient Self-Pay?:    Insurance Provider:Medicare/[employer] life  Plan Type/Number:   Member ID#:       HISTORY:   Has the patient had any previous Urologist(s)?Dr Pearson     Was the patient seen in the ED?:    Has the patient had any outside testing done?:    Does the patient have a personal history of cancer?:no

## 2024-04-01 NOTE — TREATMENT PLAN
Urology covering [facility]    Received TT about patient presented emergency department with flank pain.  CT scan with multiple nonobstructing stones left kidney no hydronephrosis.    Labs within normal limits (WBC 8.46, creat 1.06)  Urine testing unremarkable (no pyuria or bacteria)  Afebrile, vital signs stable  Okay for discharge, follow-up with urology outpatient.  Message sent to [facility] office to schedule follow-up in the next couple weeks

## 2024-04-01 NOTE — DISCHARGE INSTRUCTIONS
Follow-up with urology.  A referral was placed.  Return for worsening of symptoms.   Please be aware the medication you are being prescribed oxycodone can be sedating and therefore you should not use with any other sedating medications or alcohol, operate heavy machinery or drive until you know how it affects you.

## 2024-04-01 NOTE — TELEPHONE ENCOUNTER
Pt in ED waiting to be discherged. Pt has 5 kidney stones.  Some with is staghorn type according to CT.  Pt made appt for 5/16/24.  Please review for appropriate time frame for appt.    Pt cb [phone]

## 2024-04-01 NOTE — TELEPHONE ENCOUNTER
Patient calling in looking for number for [facility] Urology office. Main number [phone] for office provided.

## 2024-04-01 NOTE — ED PROVIDER NOTES
History  Chief Complaint   Patient presents with    Flank Pain     Left sided flank pain x2 weeks. Feels similar to prior kidney stones.      Patient is a 70 yo M arriving for evaluation of Left flank pain/left lower back pain he reports is similar to prior kidney stones. Last kidney stone was in 2017. Patient states needed procedure at that time.  Denies any blood in his urine.  Patient reports that the pain waxes and wanes.  Patient denies any history of diverticulitis.  Patient has no abdominal tenderness at this time.  Patient reports that he is tender in his left flank.  Patient has no fevers or chills.  Patient states this feels exactly like his last kidney stones.  Patient denies any chest pain or shortness of breath.  Patient denies any difficulty urinating past his baseline.        Prior to Admission Medications   Prescriptions Last Dose Informant Patient Reported? Taking?   B Complex Vitamins (VITAMIN B COMPLEX PO)  Self Yes No   Sig: Take 1 capsule by mouth   Lactobacillus (ACIDOPHILUS PO)  Self Yes No   Sig: Take 1 tablet by mouth   Magnesium 100 MG CAPS  Self Yes No   Sig: Take 500 mg by mouth   Melatonin ER 10 MG TBCR  Self Yes No   Sig: Take 15 mg by mouth   Omega-3 Fatty Acids (FISH OIL PO)  Self Yes No   Sig: Take 1 g by mouth   acetaminophen (TYLENOL) 500 mg tablet  Self Yes No   Sig: Take 1,000 mg by mouth every 6 (six) hours   amLODIPine (NORVASC) 10 mg tablet  Self Yes No   Sig: Take 10 mg by mouth   aspirin (ASPIRIN 81) 81 mg chewable tablet  Self Yes No   Sig: Chew 81 mg   aspirin-acetaminophen-caffeine (EXCEDRIN MIGRAINE) 250-250-65 MG per tablet  Self Yes No   Sig: Take 2 tablets by mouth   ergocalciferol (VITAMIN D2) 50,000 units  Self Yes No   Sig: Take 50,000 Units by mouth once a week   hydrochlorothiazide (HYDRODIURIL) 25 mg tablet  Self Yes No   Sig: Take 25 mg by mouth daily   losartan (COZAAR) 100 MG tablet  Self Yes No   Sig: Take 100 mg by mouth daily   traMADol (ULTRAM) 50 mg  tablet  Self Yes No   Sig: Take 50 mg by mouth every 6 (six) hours as needed for moderate pain      Facility-Administered Medications: None       Past Medical History:   Diagnosis Date    Chronic pain     Hypertension     Iron (Fe) deficiency anemia     Kidney stone     Spinal stenosis     neck    Stroke (HCC) 2010       Past Surgical History:   Procedure Laterality Date    BLADDER SURGERY      COLONOSCOPY      ESOPHAGOGASTRODUODENOSCOPY N/A 7/11/2018    Procedure: ESOPHAGOGASTRODUODENOSCOPY (EGD);  Surgeon: Chet Serrato MD;  Location: [facility];  Service: Gastroenterology    GASTRIC BYPASS      KNEE SURGERY Left     NECK SURGERY      PROSTATE SURGERY         Family History   Problem Relation Age of Onset    No Known Problems Mother     No Known Problems Father      I have reviewed and agree with the history as documented.    E-Cigarette/Vaping    E-Cigarette Use Never User      E-Cigarette/Vaping Substances     Social History     Tobacco Use    Smoking status: Never    Smokeless tobacco: Never   Vaping Use    Vaping status: Never Used   Substance Use Topics    Alcohol use: Not Currently     Comment: rarely    Drug use: No       Review of Systems   Constitutional: Negative.    HENT: Negative.     Eyes: Negative.    Respiratory: Negative.     Cardiovascular: Negative.    Gastrointestinal: Negative.    Endocrine: Negative.    Genitourinary:  Positive for flank pain. Negative for dysuria, penile pain and testicular pain.   Skin: Negative.    Allergic/Immunologic: Negative.    Neurological: Negative.    Hematological: Negative.    Psychiatric/Behavioral: Negative.     All other systems reviewed and are negative.      Physical Exam  Physical Exam  Vitals and nursing note reviewed.   Constitutional:       Appearance: Normal appearance. He is normal weight.   HENT:      Head: Normocephalic.      Right Ear: External ear normal.      Left Ear: External ear normal.      Nose: Nose normal.      Mouth/Throat:       Mouth: Mucous membranes are moist.      Pharynx: Oropharynx is clear.   Eyes:      Extraocular Movements: Extraocular movements intact.      Conjunctiva/sclera: Conjunctivae normal.      Pupils: Pupils are equal, round, and reactive to light.   Cardiovascular:      Rate and Rhythm: Normal rate and regular rhythm.      Pulses: Normal pulses.      Heart sounds: Normal heart sounds.   Pulmonary:      Effort: Pulmonary effort is normal.      Breath sounds: Normal breath sounds.   Abdominal:      General: Abdomen is flat. Bowel sounds are normal. There is no distension.      Palpations: Abdomen is soft. There is no mass.      Tenderness: There is no abdominal tenderness. There is left CVA tenderness. There is no right CVA tenderness, guarding or rebound.      Hernia: No hernia is present.   Musculoskeletal:      Cervical back: Normal range of motion.   Skin:     General: Skin is warm.      Capillary Refill: Capillary refill takes less than 2 seconds.   Neurological:      General: No focal deficit present.      Mental Status: He is alert. Mental status is at baseline.   Psychiatric:         Mood and Affect: Mood normal.         Behavior: Behavior normal.         Thought Content: Thought content normal.         Judgment: Judgment normal.         Vital Signs  ED Triage Vitals [04/01/24 1037]   Temperature Pulse Respirations Blood Pressure SpO2   97.6 °F (36.4 °C) 69 20 (!) 189/92 94 %      Temp Source Heart Rate Source Patient Position - Orthostatic VS BP Location FiO2 (%)   Temporal Monitor Sitting Right arm --      Pain Score       10 - Worst Possible Pain           Vitals:    04/01/24 1037 04/01/24 1300   BP: (!) 189/92 147/78   Pulse: 69 71   Patient Position - Orthostatic VS: Sitting          Visual Acuity      ED Medications  Medications - No data to display    Diagnostic Studies  Results Reviewed       Procedure Component Value Units Date/Time    Urine Microscopic [[id number]]  (Abnormal) Collected: 04/01/24 8814     Lab Status: Final result Specimen: Urine, Clean Catch Updated: 04/01/24 1220     RBC, UA 4-10 /hpf      WBC, UA None Seen /hpf      Epithelial Cells None Seen /hpf      Bacteria, UA None Seen /hpf      MUCUS THREADS None Seen    UA w Reflex to Microscopic w Reflex to Culture [[id number]]  (Abnormal) Collected: 04/01/24 1125    Lab Status: Final result Specimen: Urine, Clean Catch Updated: 04/01/24 1219     Color, UA Yellow     Clarity, UA Slightly Cloudy     Specific Gravity, UA 1.025     pH, UA 5.5     Leukocytes, UA Negative     Nitrite, UA Negative     Protein,  (2+) mg/dl      Glucose, UA Negative mg/dl      Ketones, UA Negative mg/dl      Urobilinogen, UA <2.0 mg/dl      Bilirubin, UA Negative     Occult Blood, UA Large    [place] draw [[id number]] Collected: 04/01/24 1042    Lab Status: Final result Specimen: Blood from Arm, Right Updated: 04/01/24 1201    Narrative:      The following orders were created for panel order [place] draw.  Procedure                               Abnormality         Status                     ---------                               -----------         ------                     Light Blue Top on hold[[id number]]                           Final result               Gold top on hold[[id number]]                                 Final result               Green / Black tube on hold[[id number]]                       Final result                 Please view results for these tests on the individual orders.    Lipase [[id number]]  (Abnormal) Collected: 04/01/24 1042    Lab Status: Final result Specimen: Blood from Arm, Right Updated: 04/01/24 1114     Lipase 10 u/L     Comprehensive metabolic panel [[id number]]  (Abnormal) Collected: 04/01/24 1042    Lab Status: Final result Specimen: Blood from Arm, Right Updated: 04/01/24 1114     Sodium 137 mmol/L      Potassium 3.8 mmol/L      Chloride 102 mmol/L      CO2 25 mmol/L      ANION GAP 10 mmol/L      BUN 19 mg/dL      Creatinine 1.06 mg/dL       Glucose 145 mg/dL      Calcium 9.0 mg/dL      AST 15 U/L      ALT 14 U/L      Alkaline Phosphatase 87 U/L      Total Protein 7.0 g/dL      Albumin 4.2 g/dL      Total Bilirubin 0.79 mg/dL      eGFR 71 ml/min/1.73sq m     Narrative:      National Kidney Disease Foundation guidelines for Chronic Kidney Disease (CKD):     Stage 1 with normal or high GFR (GFR > 90 mL/min/1.73 square meters)    Stage 2 Mild CKD (GFR = 60-89 mL/min/1.73 square meters)    Stage 3A Moderate CKD (GFR = 45-59 mL/min/1.73 square meters)    Stage 3B Moderate CKD (GFR = 30-44 mL/min/1.73 square meters)    Stage 4 Severe CKD (GFR = 15-29 mL/min/1.73 square meters)    Stage 5 End Stage CKD (GFR <15 mL/min/1.73 square meters)  Note: GFR calculation is accurate only with a steady state creatinine    CBC and differential [[id number]]  (Abnormal) Collected: 04/01/24 1042    Lab Status: Final result Specimen: Blood from Arm, Right Updated: 04/01/24 1055     WBC 8.46 Thousand/uL      RBC 4.65 Million/uL      Hemoglobin 14.1 g/dL      Hematocrit 43.6 %      MCV 94 fL      MCH 30.3 pg      MCHC 32.3 g/dL      RDW 13.2 %      MPV 8.9 fL      Platelets 230 Thousands/uL      nRBC 0 /100 WBCs      Neutrophils Relative 83 %      Immature Grans % 0 %      Lymphocytes Relative 11 %      Monocytes Relative 5 %      Eosinophils Relative 0 %      Basophils Relative 1 %      Neutrophils Absolute 7.10 Thousands/µL      Absolute Immature Grans 0.03 Thousand/uL      Absolute Lymphocytes 0.89 Thousands/µL      Absolute Monocytes 0.38 Thousand/µL      Eosinophils Absolute 0.02 Thousand/µL      Basophils Absolute 0.04 Thousands/µL                    CT renal stone study abdomen pelvis wo contrast   Final Result by Rachid Faith MD (04/01 1223)      Left-sided nephrolithiasis without evidence of hydronephrosis. No obstructing urinary tract calculus is seen.         Workstation performed: [id number]                    Procedures  Procedures         ED Course                                SBIRT 22yo+      Flowsheet Row Most Recent Value   Initial Alcohol Screen: US AUDIT-C     1. How often do you have a drink containing alcohol? 0 Filed at: 04/01/2024 1127   2. How many drinks containing alcohol do you have on a typical day you are drinking?  0 Filed at: 04/01/2024 1127   3a. Male UNDER 65: How often do you have five or more drinks on one occasion? 0 Filed at: 04/01/2024 1127   3b. FEMALE Any Age, or MALE 65+: How often do you have 4 or more drinks on one occassion? 0 Filed at: 04/01/2024 1127   Audit-C Score 0 Filed at: 04/01/2024 1127   LACI: How many times in the past year have you...    Used an illegal drug or used a prescription medication for non-medical reasons? Never Filed at: 04/01/2024 1127                      Medical Decision Making  Differential diagnosis including ELINOR, kidney stone, UTI  Patient arriving today with left flank pain he reports a similar to kidney stones.  CT renal study ordered for evaluation of nephrolithiasis.  Patient to progress reassuring with no leukocytosis, no anemia, no ELINOR, no gross electro abnormality.  UA is negative for signs of infection.  Patient vital signs are stable.  Patient was offered pain medication multiple times and declines.  Patient found to have multiple left-sided kidney stones.  Due to the size, and amount, this was discussed with RYAN Resendiz of urology with recommendation for appropriate outpatient follow-up.  Referral placed to Dr. Maddox which with whom patient is established.  Patient provided with Flomax.  Did provide patient with a short course of narcotic pain medicine in the acute setting of kidney stone for the past 2 weeks.  Discussed sedative medication precautions, narcotic precautions.  Patient is understanding.  Discussed return precautions to the emergency room.  PVR within normal limits.  Reviewed reasons to return to ed.  Patient verbalized understanding of diagnosis and agreement with discharge  plan of care as well as understanding of reasons to return to ed.      Amount and/or Complexity of Data Reviewed  Labs: ordered.  Radiology: ordered.    Risk  Prescription drug management.             Disposition  Final diagnoses:   Flank pain   Kidney stone     Time reflects when diagnosis was documented in both MDM as applicable and the Disposition within this note       Time User Action Codes Description Comment    4/1/2024  1:10 PM Bev Gardner Add [R10.9] Flank pain     4/1/2024  1:10 PM Bev Gardner Add [N20.0] Kidney stone           ED Disposition       ED Disposition   Discharge    Condition   Stable    Date/Time   Mon Apr 1, 2024 1309    Comment   Ramu Rubio discharge to home/self care.                   Follow-up Information       Follow up With Specialties Details Why Contact Info    Ruben Maddox MD Urology Schedule an appointment as soon as possible for a visit today For further evaluation of symptoms [address] [place] [address]  [facility] [address]  [phone]              Discharge Medication List as of 4/1/2024  1:18 PM        START taking these medications    Details   oxyCODONE (ROXICODONE) 5 immediate release tablet Take 1 tablet (5 mg total) by mouth every 6 (six) hours as needed for moderate pain for up to 10 days Max Daily Amount: 20 mg, Starting Mon 4/1/2024, Until Thu 4/11/2024 at 2359, Normal      tamsulosin (FLOMAX) 0.4 mg Take 1 capsule (0.4 mg total) by mouth daily with dinner, Starting Mon 4/1/2024, Normal           CONTINUE these medications which have NOT CHANGED    Details   acetaminophen (TYLENOL) 500 mg tablet Take 1,000 mg by mouth every 6 (six) hours, Historical Med      amLODIPine (NORVASC) 10 mg tablet Take 10 mg by mouth, Historical Med      aspirin (ASPIRIN 81) 81 mg chewable tablet Chew 81 mg, Historical Med      aspirin-acetaminophen-caffeine (EXCEDRIN MIGRAINE) 250-250-65 MG per tablet Take 2 tablets by mouth, Historical Med      B  Complex Vitamins (VITAMIN B COMPLEX PO) Take 1 capsule by mouth, Historical Med      ergocalciferol (VITAMIN D2) 50,000 units Take 50,000 Units by mouth once a week, Historical Med      hydrochlorothiazide (HYDRODIURIL) 25 mg tablet Take 25 mg by mouth daily, Historical Med      Lactobacillus (ACIDOPHILUS PO) Take 1 tablet by mouth, Historical Med      losartan (COZAAR) 100 MG tablet Take 100 mg by mouth daily, Historical Med      Magnesium 100 MG CAPS Take 500 mg by mouth, Historical Med      Melatonin ER 10 MG TBCR Take 15 mg by mouth, Historical Med      Omega-3 Fatty Acids (FISH OIL PO) Take 1 g by mouth, Historical Med      traMADol (ULTRAM) 50 mg tablet Take 50 mg by mouth every 6 (six) hours as needed for moderate pain, Historical Med                 PDMP Review       None            ED Provider  Electronically Signed by             RYAN Elena  04/01/24 5396

## 2024-04-03 ENCOUNTER — OFFICE VISIT (OUTPATIENT)
Dept: UROLOGY | Facility: MEDICAL CENTER | Age: 69
End: 2024-04-03
Payer: MEDICARE

## 2024-04-03 VITALS
DIASTOLIC BLOOD PRESSURE: 90 MMHG | SYSTOLIC BLOOD PRESSURE: 150 MMHG | OXYGEN SATURATION: 96 % | WEIGHT: 295 LBS | HEART RATE: 60 BPM | HEIGHT: 69 IN | BODY MASS INDEX: 43.69 KG/M2

## 2024-04-03 DIAGNOSIS — N13.8 BPH WITH URINARY OBSTRUCTION: ICD-10-CM

## 2024-04-03 DIAGNOSIS — R10.9 FLANK PAIN: ICD-10-CM

## 2024-04-03 DIAGNOSIS — N20.0 KIDNEY STONE: ICD-10-CM

## 2024-04-03 DIAGNOSIS — N20.0 CALCULUS OF KIDNEY: Primary | ICD-10-CM

## 2024-04-03 DIAGNOSIS — N40.1 BPH WITH URINARY OBSTRUCTION: ICD-10-CM

## 2024-04-03 LAB
SL AMB  POCT GLUCOSE, UA: ABNORMAL
SL AMB LEUKOCYTE ESTERASE,UA: ABNORMAL
SL AMB POCT BILIRUBIN,UA: ABNORMAL
SL AMB POCT BLOOD,UA: ABNORMAL
SL AMB POCT CLARITY,UA: ABNORMAL
SL AMB POCT COLOR,UA: ABNORMAL
SL AMB POCT KETONES,UA: ABNORMAL
SL AMB POCT NITRITE,UA: ABNORMAL
SL AMB POCT PH,UA: 5.5
SL AMB POCT SPECIFIC GRAVITY,UA: 1.02
SL AMB POCT URINE PROTEIN: ABNORMAL
SL AMB POCT UROBILINOGEN: 0.2

## 2024-04-03 PROCEDURE — 99214 OFFICE O/P EST MOD 30 MIN: CPT | Performed by: UROLOGY

## 2024-04-03 PROCEDURE — 81003 URINALYSIS AUTO W/O SCOPE: CPT | Performed by: UROLOGY

## 2024-04-03 RX ORDER — SACCHAROMYCES BOULARDII 250 MG
250 CAPSULE ORAL DAILY
COMMUNITY

## 2024-04-03 RX ORDER — ESCITALOPRAM OXALATE 5 MG/1
5 TABLET ORAL
COMMUNITY
Start: 2024-03-30

## 2024-04-03 RX ORDER — TRAMADOL HYDROCHLORIDE 50 MG/1
50 TABLET ORAL EVERY 6 HOURS PRN
Qty: 20 TABLET | Refills: 0 | Status: SHIPPED | OUTPATIENT
Start: 2024-04-03

## 2024-04-03 RX ORDER — TAMSULOSIN HYDROCHLORIDE 0.4 MG/1
0.4 CAPSULE ORAL
Qty: 90 CAPSULE | Refills: 3 | Status: SHIPPED | OUTPATIENT
Start: 2024-04-03

## 2024-04-03 NOTE — PROGRESS NOTES
"   HISTORY:    1 week severe left CVA and flank pain.    No dysuria fevers or chills    ER visit with CT scan 2 days ago showing a 12 x 19 mm stone extending from lower pole calyx up to the edge of renal pelvis.  No obstruction, no hydronephrosis.    Underwent percutaneous stone surgery 8 to 10 years ago with us.    No voiding symptoms         ASSESSMENT / PLAN:    CT films reviewed and shown to the patient.  A stone this large has likely been there months and probably years.    The pain was present 3 to 4 days before the CT was done.  And because there is no obstruction on CT, I think it is unlikely this stone is the cause of the flank pain.    He has some other musculoskeletal pain that is under evaluation    Option discussed for treatment of the stone.    Percutaneous stone technique has been described.    Ureteroscopy, laser stone stent placement.  With high-power laser, I recommend this approach as opposed to percutaneous.    He knows it takes a long time for particles to pass, he will need stent for probably 3 to 4 weeks.    All questions answered, he consents.    He asked about more pain medicine, I prescribed tramadol every 6 hours as needed    The following portions of the patient's history were reviewed and updated as appropriate: allergies, current medications, past family history, past medical history, past social history, past surgical history, and problem list.    Review of Systems      Objective:     Physical Exam  Abdominal:      Comments: Soft, nontender         No results found for: \"PSA\"]  BUN   Date Value Ref Range Status   04/01/2024 19 5 - 25 mg/dL Final     Creatinine   Date Value Ref Range Status   04/01/2024 1.06 0.60 - 1.30 mg/dL Final     Comment:     Standardized to IDMS reference method     No components found for: \"CBC\"      Patient Active Problem List   Diagnosis    Pes anserinus bursitis of right knee        Diagnoses and all orders for this visit:    Calculus of kidney  -     POCT " urine dip auto non-scope    Flank pain  -     Ambulatory Referral to Urology  -     POCT urine dip auto non-scope  -     traMADol (ULTRAM) 50 mg tablet; Take 1 tablet (50 mg total) by mouth every 6 (six) hours as needed for moderate pain    BPH with urinary obstruction  -     tamsulosin (FLOMAX) 0.4 mg; Take 1 capsule (0.4 mg total) by mouth daily with dinner    Kidney stone    Other orders  -     saccharomyces boulardii (Florastor) 250 mg capsule; Take 250 mg by mouth daily  -     escitalopram (LEXAPRO) 5 mg tablet; Take 5 mg by mouth daily at bedtime           Patient ID: Ramu Rubio is a 69 y.o. male.      Current Outpatient Medications:     acetaminophen (TYLENOL) 500 mg tablet, Take 1,000 mg by mouth every 6 (six) hours, Disp: , Rfl:     amLODIPine (NORVASC) 10 mg tablet, Take 10 mg by mouth, Disp: , Rfl:     aspirin (ASPIRIN 81) 81 mg chewable tablet, Chew 81 mg, Disp: , Rfl:     aspirin-acetaminophen-caffeine (EXCEDRIN MIGRAINE) 250-250-65 MG per tablet, Take 2 tablets by mouth, Disp: , Rfl:     B Complex Vitamins (VITAMIN B COMPLEX PO), Take 1 capsule by mouth, Disp: , Rfl:     ergocalciferol (VITAMIN D2) 50,000 units, Take 50,000 Units by mouth once a week, Disp: , Rfl:     escitalopram (LEXAPRO) 5 mg tablet, Take 5 mg by mouth daily at bedtime, Disp: , Rfl:     hydrochlorothiazide (HYDRODIURIL) 25 mg tablet, Take 25 mg by mouth daily, Disp: , Rfl:     Lactobacillus (ACIDOPHILUS PO), Take 1 tablet by mouth, Disp: , Rfl:     losartan (COZAAR) 100 MG tablet, Take 100 mg by mouth daily, Disp: , Rfl:     Magnesium 100 MG CAPS, Take 500 mg by mouth, Disp: , Rfl:     Melatonin ER 10 MG TBCR, Take 15 mg by mouth, Disp: , Rfl:     Omega-3 Fatty Acids (FISH OIL PO), Take 1 g by mouth, Disp: , Rfl:     saccharomyces boulardii (Florastor) 250 mg capsule, Take 250 mg by mouth daily, Disp: , Rfl:     tamsulosin (FLOMAX) 0.4 mg, Take 1 capsule (0.4 mg total) by mouth daily with dinner, Disp: 90 capsule, Rfl: 3     traMADol (ULTRAM) 50 mg tablet, Take 50 mg by mouth every 6 (six) hours as needed for moderate pain, Disp: , Rfl:     traMADol (ULTRAM) 50 mg tablet, Take 1 tablet (50 mg total) by mouth every 6 (six) hours as needed for moderate pain, Disp: 20 tablet, Rfl: 0    oxyCODONE (ROXICODONE) 5 immediate release tablet, Take 1 tablet (5 mg total) by mouth every 6 (six) hours as needed for moderate pain for up to 10 days Max Daily Amount: 20 mg (Patient not taking: Reported on 4/3/2024), Disp: 6 tablet, Rfl: 0    Past Medical History:   Diagnosis Date    Chronic pain     Hypertension     Iron (Fe) deficiency anemia     Kidney stone     Spinal stenosis     neck    Stroke (HCC) 2010       Past Surgical History:   Procedure Laterality Date    BLADDER SURGERY      COLONOSCOPY      ESOPHAGOGASTRODUODENOSCOPY N/A 7/11/2018    Procedure: ESOPHAGOGASTRODUODENOSCOPY (EGD);  Surgeon: Chet Serrato MD;  Location: [facility];  Service: Gastroenterology    GASTRIC BYPASS      KNEE SURGERY Left     NECK SURGERY      PROSTATE SURGERY         Social History

## 2024-04-04 NOTE — TELEPHONE ENCOUNTER
Patient seen in office yesterday for non obstructing kidney stone where a case was placed by Dr. Purcell for a #5. Emailed Dr. Maddox 4/4 to see if he would take on this case since he has next available, waiting on MD response.     -signed consent scanned into Media in the meantime

## 2024-04-04 NOTE — TELEPHONE ENCOUNTER
Patient called in requesting to speak w/ surgery scheduler. Per , Advised patient that surgery scheduler just got his orders for surgery as he was just in office yesterday. Patient a little frustrated as he states he is in a lot of pain. Advised severe pain 8-10/10 he should go to ED. Advised surgery scheduler would call by Monday.

## 2024-04-05 NOTE — TELEPHONE ENCOUNTER
"Alanis Lehman1 hour ago (11:40 AM)     BN  Summary: Patient called for SS   Patient called for SS. I told him she would call him back        Dr. Maddox ok'd case \"Ok for 5/10 at [facility]-Need High powered laser and CVAC-long case\".    Called and spoke with patient and confirmed 5/10 @ [place] with Dr. Maddox. Let him know he is also on a cancellation list.    Verbally went over prep:  -NPO  -Needs ride (outpatient)  -Hospital calls afternoon prior with arrival time  -Is aware he needs a UC * EKG 2 weeks prior    Let him know everything will be mailed in surgery packet as well.   "

## 2024-04-08 ENCOUNTER — PREP FOR PROCEDURE (OUTPATIENT)
Dept: UROLOGY | Facility: MEDICAL CENTER | Age: 69
End: 2024-04-08

## 2024-04-08 DIAGNOSIS — Z01.812 PRE-OPERATIVE LABORATORY EXAMINATION: ICD-10-CM

## 2024-04-08 DIAGNOSIS — Z01.810 PRE-OPERATIVE CARDIOVASCULAR EXAMINATION: ICD-10-CM

## 2024-04-08 DIAGNOSIS — N20.0 CALCULUS OF KIDNEY: Primary | ICD-10-CM

## 2024-04-08 DIAGNOSIS — R39.89 SUSPECTED UTI: ICD-10-CM

## 2024-04-08 NOTE — TELEPHONE ENCOUNTER
"Called out and spoke with patient. He says he has pain that is help \"a little\" by the tramadol but is having trouble sleeping. He asked if I had any cancellations and I have not yet since speaking with him on Friday.     I let him know I would pass his message over to Dr. Purcell about him pain and keep my eye out for cancellations.   "

## 2024-04-08 NOTE — TELEPHONE ENCOUNTER
Called and spoke with patient and let him know Dr. Vo would be able to do his surgery on 4/22 @ [place]. He asked to still be called if I get any other cancellations sooner. ER precautions went over as well.     He is aware he needs an EKG and urine culture done either today or tomorrow and will go to the [facility] to get that done as a walk in.     Verbally went over prep again, and put his updated surgery packet in Media with all his information as well.     Confirmed email on file, emailed packet 4/8  -change request sent to OR   -emailed CVAC rep 4/8

## 2024-04-10 RX ORDER — MULTIVIT WITH MINERALS/LUTEIN
250 TABLET ORAL DAILY
COMMUNITY

## 2024-04-10 RX ORDER — UREA 10 %
800 LOTION (ML) TOPICAL DAILY
COMMUNITY

## 2024-04-10 NOTE — PRE-PROCEDURE INSTRUCTIONS
Pre-Surgery Instructions:   Medication Instructions    acetaminophen (TYLENOL) 500 mg tablet Uses PRN- OK to take day of surgery    amLODIPine (NORVASC) 10 mg tablet Take day of surgery.    ascorbic acid (VITAMIN C) 250 mg tablet Hold day of surgery.    aspirin (ASPIRIN 81) 81 mg chewable tablet Instructions provided by MD- stop 5 days prior to surgery. Last dose 4/16/24    aspirin-acetaminophen-caffeine (EXCEDRIN MIGRAINE) 250-250-65 MG per tablet Stop taking 7 days prior to surgery.    B Complex Vitamins (VITAMIN B COMPLEX PO) Hold day of surgery.    Calcium Carbonate (CALCIUM 500 PO) Hold day of surgery.    escitalopram (LEXAPRO) 5 mg tablet Take night before surgery    folic acid (FOLVITE) 800 MCG tablet Hold day of surgery.    hydrochlorothiazide (HYDRODIURIL) 25 mg tablet Hold day of surgery.    Ibuprofen-Acetaminophen (ADVIL DUAL ACTION PO) Stop taking 7 days prior to surgery.    Lactobacillus (ACIDOPHILUS PO) Hold day of surgery.    losartan (COZAAR) 100 MG tablet Hold day of surgery.    Magnesium 100 MG CAPS Hold day of surgery.    Melatonin ER 10 MG TBCR Take night before surgery    Omega-3 Fatty Acids (FISH OIL PO) Stop taking 7 days prior to surgery.    tamsulosin (FLOMAX) 0.4 mg Take night before surgery    traMADol (ULTRAM) 50 mg tablet May take the day of surgery if needed    VITAMIN D, ERGOCALCIFEROL, PO Hold day of surgery.   Medication instructions for day surgery reviewed. Please use only a sip of water to take your instructed medications. Avoid all over the counter vitamins, supplements and NSAIDS for one week prior to surgery per anesthesia guidelines. Tylenol is ok to take as needed.     You will receive a call one business day prior to surgery with an arrival time and hospital directions. If your surgery is scheduled on a Monday, the hospital will be calling you on the Friday prior to your surgery. If you have not heard from anyone by 8pm, please call the hospital supervisor through the  hospital  at [phone]. ([place] [phone] or [place] [phone]).    Do not eat or drink anything after midnight the night before your surgery, including candy, mints, lifesavers, or chewing gum. Do not drink alcohol 24hrs before your surgery. Try not to smoke at least 24hrs before your surgery.       Follow the pre surgery showering instructions as listed in the “My Surgical Experience Booklet” or otherwise provided by your surgeon's office. Do not use a blade to shave the surgical area 1 week before surgery. It is okay to use a clean electric clippers up to 24 hours before surgery. Do not apply any lotions, creams, including makeup, cologne, deodorant, or perfumes after showering on the day of your surgery. Do not use dry shampoo, hair spray, hair gel, or any type of hair products.     No contact lenses, eye make-up, or artificial eyelashes. Remove nail polish, including gel polish, and any artificial, gel, or acrylic nails if possible. Remove all jewelry including rings and body piercing jewelry.     Wear causal clothing that is easy to take on and off. Consider your type of surgery.    Keep any valuables, jewelry, piercings at home. Please bring any specially ordered equipment (sling, braces) if indicated.    Arrange for a responsible person to drive you to and from the hospital on the day of your surgery. Please confirm the visitor policy for the day of your procedure when you receive your phone call with an arrival time.     Call the surgeon's office with any new illnesses, exposures, or additional questions prior to surgery.    Please reference your “My Surgical Experience Booklet” for additional information to prepare for your upcoming surgery.

## 2024-04-11 ENCOUNTER — APPOINTMENT (OUTPATIENT)
Dept: LAB | Facility: HOSPITAL | Age: 69
End: 2024-04-11
Payer: MEDICARE

## 2024-04-11 DIAGNOSIS — N20.0 CALCULUS OF KIDNEY: ICD-10-CM

## 2024-04-11 DIAGNOSIS — Z01.810 PRE-OPERATIVE CARDIOVASCULAR EXAMINATION: ICD-10-CM

## 2024-04-11 DIAGNOSIS — R39.89 SUSPECTED UTI: ICD-10-CM

## 2024-04-11 DIAGNOSIS — Z01.812 PRE-OPERATIVE LABORATORY EXAMINATION: ICD-10-CM

## 2024-04-11 PROCEDURE — 87086 URINE CULTURE/COLONY COUNT: CPT

## 2024-04-12 LAB — BACTERIA UR CULT: NORMAL

## 2024-04-15 LAB
ATRIAL RATE: 58 BPM
P AXIS: 49 DEGREES
PR INTERVAL: 156 MS
QRS AXIS: -32 DEGREES
QRSD INTERVAL: 98 MS
QT INTERVAL: 418 MS
QTC INTERVAL: 410 MS
T WAVE AXIS: 19 DEGREES
VENTRICULAR RATE: 58 BPM

## 2024-04-15 PROCEDURE — 93010 ELECTROCARDIOGRAM REPORT: CPT | Performed by: INTERNAL MEDICINE

## 2024-04-19 ENCOUNTER — ANESTHESIA EVENT (OUTPATIENT)
Dept: PERIOP | Facility: HOSPITAL | Age: 69
End: 2024-04-19
Payer: MEDICARE

## 2024-04-22 ENCOUNTER — HOSPITAL ENCOUNTER (OUTPATIENT)
Facility: HOSPITAL | Age: 69
Setting detail: OUTPATIENT SURGERY
Discharge: HOME | End: 2024-04-22
Attending: UROLOGY | Admitting: UROLOGY
Payer: MEDICARE

## 2024-04-22 ENCOUNTER — APPOINTMENT (OUTPATIENT)
Dept: RADIOLOGY | Facility: HOSPITAL | Age: 69
End: 2024-04-22
Payer: MEDICARE

## 2024-04-22 ENCOUNTER — ANESTHESIA (OUTPATIENT)
Dept: PERIOP | Facility: HOSPITAL | Age: 69
End: 2024-04-22
Payer: MEDICARE

## 2024-04-22 ENCOUNTER — PREP FOR PROCEDURE (OUTPATIENT)
Dept: UROLOGY | Facility: CLINIC | Age: 69
End: 2024-04-22

## 2024-04-22 VITALS
TEMPERATURE: 97.3 F | HEIGHT: 69 IN | BODY MASS INDEX: 44.31 KG/M2 | DIASTOLIC BLOOD PRESSURE: 85 MMHG | HEART RATE: 73 BPM | RESPIRATION RATE: 18 BRPM | WEIGHT: 299.16 LBS | OXYGEN SATURATION: 95 % | SYSTOLIC BLOOD PRESSURE: 154 MMHG

## 2024-04-22 DIAGNOSIS — N20.0 RENAL STONE: Primary | ICD-10-CM

## 2024-04-22 DIAGNOSIS — N20.0 CALCULUS OF KIDNEY: Primary | ICD-10-CM

## 2024-04-22 PROBLEM — E66.01 MORBIDLY OBESE (HCC): Status: ACTIVE | Noted: 2024-04-22

## 2024-04-22 PROCEDURE — C1747 URETEROSCOPE DIGITAL FLEX SNGL USE RVS DEFLECTION APTRA: HCPCS | Performed by: UROLOGY

## 2024-04-22 PROCEDURE — C1758 CATHETER, URETERAL: HCPCS | Performed by: UROLOGY

## 2024-04-22 PROCEDURE — C1894 INTRO/SHEATH, NON-LASER: HCPCS | Performed by: UROLOGY

## 2024-04-22 PROCEDURE — 52356 CYSTO/URETERO W/LITHOTRIPSY: CPT | Performed by: UROLOGY

## 2024-04-22 PROCEDURE — C1769 GUIDE WIRE: HCPCS | Performed by: UROLOGY

## 2024-04-22 PROCEDURE — 74420 UROGRAPHY RTRGR +-KUB: CPT

## 2024-04-22 PROCEDURE — C2617 STENT, NON-COR, TEM W/O DEL: HCPCS | Performed by: UROLOGY

## 2024-04-22 DEVICE — INLAY OPTIMA URETERAL STENT W/O GUIDEWIRE
Type: IMPLANTABLE DEVICE | Site: URETER | Status: FUNCTIONAL
Brand: BARD® INLAY OPTIMA® URETERAL STENT

## 2024-04-22 RX ORDER — PHENAZOPYRIDINE HYDROCHLORIDE 100 MG/1
100 TABLET, FILM COATED ORAL 3 TIMES DAILY PRN
Qty: 20 TABLET | Refills: 0 | Status: SHIPPED | OUTPATIENT
Start: 2024-04-22

## 2024-04-22 RX ORDER — SODIUM CHLORIDE 9 MG/ML
125 INJECTION, SOLUTION INTRAVENOUS CONTINUOUS
Status: DISCONTINUED | OUTPATIENT
Start: 2024-04-22 | End: 2024-04-22 | Stop reason: HOSPADM

## 2024-04-22 RX ORDER — PROPOFOL 10 MG/ML
INJECTION, EMULSION INTRAVENOUS AS NEEDED
Status: DISCONTINUED | OUTPATIENT
Start: 2024-04-22 | End: 2024-04-22

## 2024-04-22 RX ORDER — TAMSULOSIN HYDROCHLORIDE 0.4 MG/1
0.4 CAPSULE ORAL ONCE
Status: DISCONTINUED | OUTPATIENT
Start: 2024-04-22 | End: 2024-04-22 | Stop reason: HOSPADM

## 2024-04-22 RX ORDER — LIDOCAINE HYDROCHLORIDE 10 MG/ML
INJECTION, SOLUTION EPIDURAL; INFILTRATION; INTRACAUDAL; PERINEURAL AS NEEDED
Status: DISCONTINUED | OUTPATIENT
Start: 2024-04-22 | End: 2024-04-22

## 2024-04-22 RX ORDER — ONDANSETRON 2 MG/ML
INJECTION INTRAMUSCULAR; INTRAVENOUS AS NEEDED
Status: DISCONTINUED | OUTPATIENT
Start: 2024-04-22 | End: 2024-04-22

## 2024-04-22 RX ORDER — FENTANYL CITRATE 50 UG/ML
INJECTION, SOLUTION INTRAMUSCULAR; INTRAVENOUS AS NEEDED
Status: DISCONTINUED | OUTPATIENT
Start: 2024-04-22 | End: 2024-04-22

## 2024-04-22 RX ORDER — ONDANSETRON 2 MG/ML
4 INJECTION INTRAMUSCULAR; INTRAVENOUS ONCE AS NEEDED
Status: DISCONTINUED | OUTPATIENT
Start: 2024-04-22 | End: 2024-04-22 | Stop reason: HOSPADM

## 2024-04-22 RX ORDER — ACETAMINOPHEN 325 MG/1
650 TABLET ORAL EVERY 6 HOURS PRN
Status: DISCONTINUED | OUTPATIENT
Start: 2024-04-22 | End: 2024-04-22 | Stop reason: HOSPADM

## 2024-04-22 RX ORDER — PHENAZOPYRIDINE HYDROCHLORIDE 100 MG/1
100 TABLET, FILM COATED ORAL ONCE
Status: COMPLETED | OUTPATIENT
Start: 2024-04-22 | End: 2024-04-22

## 2024-04-22 RX ORDER — CEFUROXIME AXETIL 250 MG/1
250 TABLET ORAL EVERY 12 HOURS SCHEDULED
Qty: 6 TABLET | Refills: 0 | Status: SHIPPED | OUTPATIENT
Start: 2024-04-22 | End: 2024-04-25

## 2024-04-22 RX ORDER — ROCURONIUM BROMIDE 10 MG/ML
INJECTION, SOLUTION INTRAVENOUS AS NEEDED
Status: DISCONTINUED | OUTPATIENT
Start: 2024-04-22 | End: 2024-04-22

## 2024-04-22 RX ORDER — GLYCOPYRROLATE 0.2 MG/ML
INJECTION INTRAMUSCULAR; INTRAVENOUS AS NEEDED
Status: DISCONTINUED | OUTPATIENT
Start: 2024-04-22 | End: 2024-04-22

## 2024-04-22 RX ORDER — MAGNESIUM HYDROXIDE 1200 MG/15ML
LIQUID ORAL AS NEEDED
Status: DISCONTINUED | OUTPATIENT
Start: 2024-04-22 | End: 2024-04-22 | Stop reason: HOSPADM

## 2024-04-22 RX ORDER — MIDAZOLAM HYDROCHLORIDE 2 MG/2ML
INJECTION, SOLUTION INTRAMUSCULAR; INTRAVENOUS AS NEEDED
Status: DISCONTINUED | OUTPATIENT
Start: 2024-04-22 | End: 2024-04-22

## 2024-04-22 RX ORDER — FENTANYL CITRATE/PF 50 MCG/ML
25 SYRINGE (ML) INJECTION
Status: DISCONTINUED | OUTPATIENT
Start: 2024-04-22 | End: 2024-04-22 | Stop reason: HOSPADM

## 2024-04-22 RX ADMIN — ONDANSETRON 4 MG: 2 INJECTION INTRAMUSCULAR; INTRAVENOUS at 08:32

## 2024-04-22 RX ADMIN — ACETAMINOPHEN 325MG 650 MG: 325 TABLET ORAL at 11:14

## 2024-04-22 RX ADMIN — FENTANYL CITRATE 50 MCG: 50 INJECTION INTRAMUSCULAR; INTRAVENOUS at 08:52

## 2024-04-22 RX ADMIN — ROCURONIUM BROMIDE 40 MG: 10 INJECTION, SOLUTION INTRAVENOUS at 08:32

## 2024-04-22 RX ADMIN — GLYCOPYRROLATE 0.2 MG: 0.2 INJECTION, SOLUTION INTRAMUSCULAR; INTRAVENOUS at 08:40

## 2024-04-22 RX ADMIN — SODIUM CHLORIDE 125 ML/HR: 0.9 INJECTION, SOLUTION INTRAVENOUS at 07:02

## 2024-04-22 RX ADMIN — PHENAZOPYRIDINE HYDROCHLORIDE 100 MG: 100 TABLET ORAL at 11:14

## 2024-04-22 RX ADMIN — SODIUM CHLORIDE: 0.9 INJECTION, SOLUTION INTRAVENOUS at 09:49

## 2024-04-22 RX ADMIN — FENTANYL CITRATE 50 MCG: 50 INJECTION INTRAMUSCULAR; INTRAVENOUS at 08:32

## 2024-04-22 RX ADMIN — LIDOCAINE HYDROCHLORIDE 100 MG: 10 INJECTION, SOLUTION EPIDURAL; INFILTRATION; INTRACAUDAL; PERINEURAL at 08:32

## 2024-04-22 RX ADMIN — SUGAMMADEX 300 MG: 100 INJECTION, SOLUTION INTRAVENOUS at 09:38

## 2024-04-22 RX ADMIN — MIDAZOLAM 2 MG: 1 INJECTION INTRAMUSCULAR; INTRAVENOUS at 08:25

## 2024-04-22 RX ADMIN — PROPOFOL 200 MG: 10 INJECTION, EMULSION INTRAVENOUS at 08:32

## 2024-04-22 RX ADMIN — GLYCOPYRROLATE 0.2 MG: 0.2 INJECTION, SOLUTION INTRAMUSCULAR; INTRAVENOUS at 08:45

## 2024-04-22 RX ADMIN — Medication 3000 MG: at 08:20

## 2024-04-22 NOTE — DISCHARGE INSTR - AVS FIRST PAGE
Ramu Rubio  -today you underwent ureteroscopy of your large partial staghorn left renal stone.  The stone was well treated however given the amount of debris and stone material, I am recommending a secondary procedure with vacuum technology to be able to remove most of the dust and debris.    You have a stent in place. Our office will call to arrange the timing of this second stage surgery. Failure to followup can result in dangerous complication as the ureteral stent cannot remain in place indefinitely.    It is common to have some mild symptoms from the stent placement. Pain with urination, feeling of needing to urinate frequency or urgently, flank discomfort or blood in the urine. Utilize the medications provided (flomax, ditropan, pyridium) as well as ibuprofen/motrin for pain control. Hydrate liberally with oral fluids.    [facility] [phone]

## 2024-04-22 NOTE — OP NOTE
OPERATIVE REPORT  PATIENT NAME: Ramu Rubio    :  1955  MRN: [id number]  Pt Location: [facility] OR ROOM 03    SURGERY DATE: 2024    Surgeons and Role:     * Darrius Vo MD - Primary    Preop Diagnosis:  Calculus of kidney [N20.0]    Post-Op Diagnosis Codes:     * Calculus of kidney [N20.0]    Procedure(s):  Left - CYSTO USCOPE W/ LASER. &  STENT    Specimen(s):  ID Type Source Tests Collected by Time Destination   A :  Calculus Ureter, Left STONE ANALYSIS Darrius Vo MD 2024 0901        Estimated Blood Loss:   Minimal    Drains:  Ureteral Internal Stent Left ureter 6 Fr. (Active)   Number of days: 0       Anesthesia Type:   General    Operative Indications:  Calculus of kidney [N20.0]      Operative Findings:  Large mid/lower pole staghorn calculus  Stone fragmented into small pieces and debris  6x26 Stent on the LEFT, no string    Complications:   None    Procedure and Technique:  Ramu Rubio is a 69 y.o.-year-old male with a 19mm LEFT sided partial staghorn stone. They are proceeding to the operating room on 2024  to undergo LEFT-sided ureteroscopy with holmium laser lithotripsy.      Risk and benefits of the procedure were discussed and reviewed.  Informed consent was obtained.  After the smooth induction of endotracheal anesthesia, the patient was placed in the dorsal lithotomy position.  His genitalia was prepped and draped in a sterile fashion.  Intravenous antibiotics were administered in the form of ancef.  A timeout was performed with all members of the operative team confirming the patient's identity, procedure to be performed, and laterality of the case.    A 22 Filipino rigid cystoscope with 30° lens was inserted.  The bladder was thoroughly inspected.  Attention was focused on the LEFT ureteral orifice. Bard solo plus wire placed proximally. Over the wire, dual-lumen catheter was placed. A retrograde pyelogram was performed demonstrating filling defect in  "lower portion of collecting system. At this point, a second wire was passed. One wire was secured as a \"safety\" wire while the other was used as a \"working\" wire. The dual lumen was removed leaving the wires in place.  A 10/12 45cm ureteral access sheath was then inserted over the working wire.  A [employer] single used disposable flexible ureteroscope was then passed.  The kidney was thoroughly inspected.  A stone was identified in the mid and lower pole, partial staghorn appearance.  The 200µ holmium laser fiber was utilized to decimate the stone into small fragments using high powered laser. Contrast was injected.  The kidney was thoroughly reinspected in the upper, inerpolar and lower pole regions.  There were no residual stones identified.      The flexible scope was slowly withdrawn through the ureter during a pull-out ureteroscopy.  There were no residual stones within the ureter.  The wire was backloaded through the cystoscope and a 6 Solomon Islander 26cm double-J ureteral stent was then placed in the standard fashion.  The proximal coil was appreciated in the LEFT renal pelvis and the distal coil was visualized within the bladder.  A string was not left in place.  The bladder was emptied.  2% viscous lidocaine was placed per urethra.    Overall the patient tolerated the procedure well and there were no complications.  The patient was taken out of dorsal lithotomy, extubated in the operating room and transferred to the PACU in stable condition at the conclusion of the case.    PLAN-Would recommend second look ureteroscopy with CVAC technology    Patient Disposition:  PACU         SIGNATURE: Darrius Vo MD  DATE: April 22, 2024  TIME: 9:48 AM                "

## 2024-04-22 NOTE — ANESTHESIA PREPROCEDURE EVALUATION
Procedure:  CYSTO USCOPE W/ LASER, &  STENT (Left: Bladder)    Relevant Problems   Other   (+) Morbidly obese (HCC)     (+) HLD    (+) stoke 2010     Physical Exam    Airway    Mallampati score: III  TM Distance: >3 FB  Neck ROM: full     Dental       Cardiovascular  Rhythm: regular, Rate: normal, Cardiovascular exam normal    Pulmonary  Pulmonary exam normal Breath sounds clear to auscultation    Other Findings        Anesthesia Plan  ASA Score- 3     Anesthesia Type- general with ASA Monitors.         Additional Monitors:     Airway Plan: ETT and LMA.           Plan Factors-Exercise tolerance (METS): >4 METS.    Chart reviewed.   Existing labs reviewed.     Patient is not a current smoker.      Obstructive sleep apnea risk education given perioperatively.        Induction- intravenous.    Postoperative Plan-     Informed Consent- Anesthetic plan and risks discussed with patient.

## 2024-04-22 NOTE — INTERVAL H&P NOTE
H&P reviewed. After examining the patient I find no changes in the patients condition since the H&P had been written.  Today, I met the patient in the prep and hold area.  We discussed his large almost staghorn calculus.  Patient has a history of a prior percutaneous nephrolithotomy.  Plan arranged today by the patient's primary urologist, Dr. Dallas Purcell is to proceed with high-powered laser lithotripsy via ureteroscopy.  This procedure was discussed in depth with the patient.  Risk and benefits including bleeding, infection, damage to surrounding structures were reviewed.  The possibility of the inability to treat the stone today with ureteroscopy due to underlying infection or poor anatomy with simple stent placement only was reviewed.  Additionally, the potential risk of the need for multiple staged procedures given the size of the stone was discussed and reviewed.  Patient understands he will have a stent in place after the surgery and management will be dependent on the ability to treat the stone effectively and completely.  After this discussion, the informed consent process was signed for a cystoscopy, [LEFT] retrograde pyelogram, ureteroscopy with possible laser lithotripsy and basket extraction of stone, stent placement, possible CVAC (vacuum extraction of dust).      Vitals:    04/22/24 0642   BP: 139/69   Pulse: 87   Resp: 18   Temp: (!) 97.3 °F (36.3 °C)   SpO2: 96%

## 2024-04-23 ENCOUNTER — TELEPHONE (OUTPATIENT)
Dept: UROLOGY | Facility: CLINIC | Age: 69
End: 2024-04-23

## 2024-04-23 DIAGNOSIS — T83.84XA PAIN DUE TO URETERAL STENT, INITIAL ENCOUNTER (HCC): Primary | ICD-10-CM

## 2024-04-24 DIAGNOSIS — N20.0 CALCULUS OF KIDNEY: Primary | ICD-10-CM

## 2024-04-24 NOTE — PROGRESS NOTES
Patient had left ureteroscopy for 19 mm stone 1 week ago    He is planned for a second look ureteroscopy in 4 weeks.    I will check a KUB in 3 weeks to see if there are still lots of stone particles.

## 2024-04-25 NOTE — TELEPHONE ENCOUNTER
"Called and spoke with patient about an Xray ordered being placed for him. He is aware to go around 5/13-5/14 to get that done. He can walk into any [facility] and get that done, no appointment needed. I also mailed him the order as well.     Patient was asking about his stent, he was said he was told he might get it out in a week? I told him I would send a message over to Dr. Vo/Dr. Purcell's nurses to review and have them call him.     Message from Dr. Purcell:  \"This is the patient Gilda wants me to do a second look on, late May. Tell patient that I ordered a KUB abdominal x-ray to be done on May 13 or 14.  I will then decide if we have to go ahead with the second procedure.  Maybe enough particles will have passed by then\".  "

## 2024-04-25 NOTE — TELEPHONE ENCOUNTER
Returned call to the patient. Patient made aware the stent needs to remain indwelling until the second surgery.    Dr Purcell's encounter states wants KUB one week prior to surgery. U scope in 4 weeks.    Will ask Shavon PRINGLE to contact the patient with a surgical date.

## 2024-04-30 ENCOUNTER — PREP FOR PROCEDURE (OUTPATIENT)
Dept: UROLOGY | Facility: MEDICAL CENTER | Age: 69
End: 2024-04-30

## 2024-04-30 DIAGNOSIS — N20.0 CALCULUS OF KIDNEY: ICD-10-CM

## 2024-04-30 DIAGNOSIS — R39.89 SUSPECTED UTI: ICD-10-CM

## 2024-04-30 DIAGNOSIS — Z01.812 PRE-OPERATIVE LABORATORY EXAMINATION: Primary | ICD-10-CM

## 2024-04-30 RX ORDER — PHENAZOPYRIDINE HYDROCHLORIDE 200 MG/1
200 TABLET, FILM COATED ORAL 3 TIMES DAILY PRN
Qty: 30 TABLET | Refills: 1 | Status: SHIPPED | OUTPATIENT
Start: 2024-04-30

## 2024-04-30 RX ORDER — OXYBUTYNIN CHLORIDE 5 MG/1
5 TABLET ORAL 3 TIMES DAILY PRN
Qty: 30 TABLET | Refills: 1 | Status: SHIPPED | OUTPATIENT
Start: 2024-04-30

## 2024-04-30 NOTE — TELEPHONE ENCOUNTER
large partial staghorn type stone 19mm with extensive lasering, relook ureteroscopy to grab any remaining fragments not yet passed alongside stent is suggested (with CVAC), KUB mid may to plan ahead, OR 5/28 as planned. keeping the stent to dilate ureter and allow as many fragments as possible pass. i have resent pyridium and added ditropan both as needed for penis bladder or pelvis pain

## 2024-04-30 NOTE — TELEPHONE ENCOUNTER
Called and spoke with patient and confirmed surgery date of 5/28 pending KUB results. He will go get KUB done on 5/17.   Type of surgery:2nd Stage Left URS w/ CVAC  Operating physician: Dr. Purcell  Location of surgery: [facility] OR     Verbally went over prep with patient on 4/30  NPO  Bowel prep? No  Hospital calls afternoon prior with arrival time -Calls Friday afternoon for Monday surgeries  Patient needs ride to and from surgery (outpatient)   Pre-op testing to be done 2 weeks prior to surgery  Urine culture is going to be done when he gets his XRAY on 5/17  Blood thinners:   Not on any major, can continue for this surgery   Clearances needed: None     Mailed to patient on 4/30  Copy of packet scanned into Media on 4/30  Labs in packet  Soap prep in packet    Consent: on admit    Rep info:  Emailed rep on date 4/30  Dallas Lisa     [phone]  [email or web address]    @[email or web address]  Patient was asking for a refill of Pyridium ordered by Dr. Vo on 4/22. Patient states he is still having blood in his urine and is uncomfortable. He wanted to know why he has to wait so long to have his stent removed, I explained what Kitty mentioned in previous note - but he is still requesting to speak with clinical staff. He is aware this date with Dr. Purcell is also the soonest available I have, and that Dr. Vo recommended surgery end of May.

## 2024-04-30 NOTE — TELEPHONE ENCOUNTER
Call placed to Ramu and reviewed CAREN Jerez's note regarding reasoning for ureteral stent. Advised patient of pyridium and oxybutynin which were sent to his pharmacy to take prn. Reviewed side effects of medication. Patient aware to have KUB completed mid May to reassess imaging prior to scheduled procedure.

## 2024-05-14 ENCOUNTER — TELEPHONE (OUTPATIENT)
Dept: UROLOGY | Facility: MEDICAL CENTER | Age: 69
End: 2024-05-14

## 2024-05-14 ENCOUNTER — HOSPITAL ENCOUNTER (OUTPATIENT)
Dept: RADIOLOGY | Facility: HOSPITAL | Age: 69
Discharge: HOME | End: 2024-05-14
Payer: MEDICARE

## 2024-05-14 ENCOUNTER — APPOINTMENT (OUTPATIENT)
Dept: LAB | Facility: HOSPITAL | Age: 69
End: 2024-05-14
Payer: MEDICARE

## 2024-05-14 DIAGNOSIS — Z01.812 PRE-OPERATIVE LABORATORY EXAMINATION: ICD-10-CM

## 2024-05-14 DIAGNOSIS — N20.0 CALCULUS OF KIDNEY: ICD-10-CM

## 2024-05-14 DIAGNOSIS — R39.89 SUSPECTED UTI: ICD-10-CM

## 2024-05-14 LAB
BACTERIA UR QL AUTO: ABNORMAL /HPF
BILIRUB UR QL STRIP: NEGATIVE
CLARITY UR: ABNORMAL
COLOR UR: ABNORMAL
GLUCOSE UR STRIP-MCNC: NEGATIVE MG/DL
HGB UR QL STRIP.AUTO: ABNORMAL
HYALINE CASTS #/AREA URNS LPF: ABNORMAL /LPF
KETONES UR STRIP-MCNC: NEGATIVE MG/DL
LEUKOCYTE ESTERASE UR QL STRIP: ABNORMAL
MUCOUS THREADS UR QL AUTO: ABNORMAL
NITRITE UR QL STRIP: POSITIVE
NON-SQ EPI CELLS URNS QL MICRO: ABNORMAL /HPF
PH UR STRIP.AUTO: 5.5 [PH]
PROT UR STRIP-MCNC: ABNORMAL MG/DL
RBC #/AREA URNS AUTO: ABNORMAL /HPF
SP GR UR STRIP.AUTO: 1.02 (ref 1–1.03)
UROBILINOGEN UR STRIP-ACNC: <2 MG/DL
WBC #/AREA URNS AUTO: ABNORMAL /HPF

## 2024-05-14 PROCEDURE — 87086 URINE CULTURE/COLONY COUNT: CPT

## 2024-05-14 PROCEDURE — 74018 RADEX ABDOMEN 1 VIEW: CPT

## 2024-05-14 PROCEDURE — 81001 URINALYSIS AUTO W/SCOPE: CPT

## 2024-05-14 NOTE — TELEPHONE ENCOUNTER
Called and spoke with patient and asked him if he remembered our conversation on 4/30 about his surgery. He said he did - he just said he did not receive the surgery packet in the mail yet.     Confirmed email address on file and sent copy of packet to email.     He is also going today to get his xray

## 2024-05-14 NOTE — TELEPHONE ENCOUNTER
Spoke to pt and advised that Dr. Purcell has asked that he go for KUB either today or tomorrow to see if he needs surgery on 5/17/24.  Pt stated he didn't know where the surgery would be.  He stated he had not heard from surgery scheduler even though there is a chart note stating he was contacted with this information.

## 2024-05-14 NOTE — TELEPHONE ENCOUNTER
----- Message from Dallas Purcell MD sent at 5/14/2024 12:48 PM EDT -----  Ask patient to go for KUB, that was ordered, tomorrow or Thursday.  That we will determine if we need to do a second procedure for his large kidney stone.  He has a stent in place.

## 2024-05-15 DIAGNOSIS — T83.84XA PAIN DUE TO URETERAL STENT, INITIAL ENCOUNTER (HCC): Primary | ICD-10-CM

## 2024-05-15 LAB — BACTERIA UR CULT: NORMAL

## 2024-05-15 RX ORDER — TROSPIUM CHLORIDE 20 MG/1
20 TABLET, FILM COATED ORAL 2 TIMES DAILY PRN
Qty: 30 TABLET | Refills: 1 | Status: SHIPPED | OUTPATIENT
Start: 2024-05-15

## 2024-05-15 NOTE — PROGRESS NOTES
I discussed results of KUB.  There are still lots of stone particles remaining, so patient does need second stage ureteroscopy.    He is having lots of frequency, oxybutynin not helping.  I will send trospium twice daily to pharmacy

## 2024-05-16 NOTE — PRE-PROCEDURE INSTRUCTIONS
Pre-Surgery Instructions:   Medication Instructions    acetaminophen (TYLENOL) 500 mg tablet Uses PRN- OK to take day of surgery    amLODIPine (NORVASC) 10 mg tablet Take day of surgery.    ascorbic acid (VITAMIN C) 250 mg tablet Stop taking 7 days prior to surgery.    aspirin (ASPIRIN 81) 81 mg chewable tablet Hold day of surgery.    aspirin-acetaminophen-caffeine (EXCEDRIN MIGRAINE) 250-250-65 MG per tablet Uses PRN- OK to take day of surgery    Calcium Carbonate (CALCIUM 500 PO) Stop taking 7 days prior to surgery.    folic acid (FOLVITE) 800 MCG tablet Stop taking 7 days prior to surgery.    hydrochlorothiazide (HYDRODIURIL) 25 mg tablet Hold day of surgery.    Ibuprofen-Acetaminophen (ADVIL DUAL ACTION PO) Stop taking 3 days prior to surgery.    Lactobacillus (ACIDOPHILUS PO) Stop taking 7 days prior to surgery.    losartan (COZAAR) 100 MG tablet Hold day of surgery.    Magnesium 100 MG CAPS Stop taking 7 days prior to surgery.    Melatonin ER 10 MG TBCR Stop taking 7 days prior to surgery.    Omega-3 Fatty Acids (FISH OIL PO) Stop taking 7 days prior to surgery.    phenazopyridine (PYRIDIUM) 100 mg tablet Uses PRN- OK to take day of surgery    phenazopyridine (PYRIDIUM) 200 mg tablet Uses PRN- OK to take day of surgery    tamsulosin (FLOMAX) 0.4 mg Take night before surgery    traMADol (ULTRAM) 50 mg tablet Uses PRN- OK to take day of surgery    traMADol (ULTRAM) 50 mg tablet Uses PRN- OK to take day of surgery    trospium chloride (SANCTURA) 20 mg tablet Uses PRN- DO NOT take day of surgery    VITAMIN D, ERGOCALCIFEROL, PO Stop taking 7 days prior to surgery.   Medication instructions for day surgery reviewed. Please use only a sip of water to take your instructed medications. Avoid all over the counter vitamins, supplements and NSAIDS for one week prior to surgery per anesthesia guidelines. Tylenol is ok to take as needed.     You will receive a call one business day prior to surgery with an arrival time and  hospital directions. If your surgery is scheduled on a Monday, the hospital will be calling you on the Friday prior to your surgery. If you have not heard from anyone by 8pm, please call the hospital supervisor through the hospital  at [phone]. ([place] [phone] or [place] [phone]).    Do not eat or drink anything after midnight the night before your surgery, including candy, mints, lifesavers, or chewing gum. Do not drink alcohol 24hrs before your surgery. Try not to smoke at least 24hrs before your surgery.       Follow the pre surgery showering instructions as listed in the “My Surgical Experience Booklet” or otherwise provided by your surgeon's office. Do not use a blade to shave the surgical area 1 week before surgery. It is okay to use a clean electric clippers up to 24 hours before surgery. Do not apply any lotions, creams, including makeup, cologne, deodorant, or perfumes after showering on the day of your surgery. Do not use dry shampoo, hair spray, hair gel, or any type of hair products.     No contact lenses, eye make-up, or artificial eyelashes. Remove nail polish, including gel polish, and any artificial, gel, or acrylic nails if possible. Remove all jewelry including rings and body piercing jewelry.     Wear causal clothing that is easy to take on and off. Consider your type of surgery.    Keep any valuables, jewelry, piercings at home. Please bring any specially ordered equipment (sling, braces) if indicated.    Arrange for a responsible person to drive you to and from the hospital on the day of your surgery. Please confirm the visitor policy for the day of your procedure when you receive your phone call with an arrival time.     Call the surgeon's office with any new illnesses, exposures, or additional questions prior to surgery.    Please reference your “My Surgical Experience Booklet” for additional information to prepare for your upcoming surgery.

## 2024-05-24 ENCOUNTER — ANESTHESIA EVENT (OUTPATIENT)
Dept: PERIOP | Facility: HOSPITAL | Age: 69
End: 2024-05-24
Payer: MEDICARE

## 2024-05-25 DIAGNOSIS — T83.84XA PAIN DUE TO URETERAL STENT, INITIAL ENCOUNTER (HCC): ICD-10-CM

## 2024-05-28 ENCOUNTER — TELEPHONE (OUTPATIENT)
Dept: UROLOGY | Facility: MEDICAL CENTER | Age: 69
End: 2024-05-28

## 2024-05-28 ENCOUNTER — ANESTHESIA (OUTPATIENT)
Dept: PERIOP | Facility: HOSPITAL | Age: 69
End: 2024-05-28
Payer: MEDICARE

## 2024-05-28 ENCOUNTER — APPOINTMENT (OUTPATIENT)
Dept: RADIOLOGY | Facility: HOSPITAL | Age: 69
End: 2024-05-28
Payer: MEDICARE

## 2024-05-28 ENCOUNTER — HOSPITAL ENCOUNTER (OUTPATIENT)
Facility: HOSPITAL | Age: 69
Setting detail: OUTPATIENT SURGERY
Discharge: HOME | End: 2024-05-28
Attending: UROLOGY | Admitting: UROLOGY
Payer: MEDICARE

## 2024-05-28 VITALS
SYSTOLIC BLOOD PRESSURE: 150 MMHG | OXYGEN SATURATION: 93 % | DIASTOLIC BLOOD PRESSURE: 77 MMHG | BODY MASS INDEX: 44.44 KG/M2 | HEIGHT: 69 IN | RESPIRATION RATE: 16 BRPM | TEMPERATURE: 97.8 F | WEIGHT: 300.05 LBS | HEART RATE: 94 BPM

## 2024-05-28 DIAGNOSIS — N20.0 RENAL STONE: Primary | ICD-10-CM

## 2024-05-28 PROBLEM — Z98.84 H/O BARIATRIC SURGERY: Status: ACTIVE | Noted: 2024-05-28

## 2024-05-28 PROBLEM — I63.9 CVA (CEREBRAL VASCULAR ACCIDENT) (HCC): Status: ACTIVE | Noted: 2024-05-28

## 2024-05-28 PROCEDURE — C1889 IMPLANT/INSERT DEVICE, NOC: HCPCS | Performed by: UROLOGY

## 2024-05-28 PROCEDURE — 51798 US URINE CAPACITY MEASURE: CPT | Performed by: UROLOGY

## 2024-05-28 PROCEDURE — C2617 STENT, NON-COR, TEM W/O DEL: HCPCS | Performed by: UROLOGY

## 2024-05-28 PROCEDURE — 74420 UROGRAPHY RTRGR +-KUB: CPT

## 2024-05-28 PROCEDURE — 87086 URINE CULTURE/COLONY COUNT: CPT | Performed by: UROLOGY

## 2024-05-28 PROCEDURE — 52356 CYSTO/URETERO W/LITHOTRIPSY: CPT | Performed by: UROLOGY

## 2024-05-28 PROCEDURE — C1747 URETEROSCOPE DIGITAL FLEX SNGL USE STD DEFLECTION APTRA: HCPCS | Performed by: UROLOGY

## 2024-05-28 PROCEDURE — NC001 PR NO CHARGE: Performed by: UROLOGY

## 2024-05-28 PROCEDURE — C1769 GUIDE WIRE: HCPCS | Performed by: UROLOGY

## 2024-05-28 PROCEDURE — 99024 POSTOP FOLLOW-UP VISIT: CPT | Performed by: UROLOGY

## 2024-05-28 DEVICE — VARIABLE LENGTH INJECTION STENT SET
Type: IMPLANTABLE DEVICE | Site: BLADDER | Status: FUNCTIONAL
Brand: CONTOUR VL™ INJECTION STENT SET

## 2024-05-28 RX ORDER — PROPOFOL 10 MG/ML
INJECTION, EMULSION INTRAVENOUS AS NEEDED
Status: DISCONTINUED | OUTPATIENT
Start: 2024-05-28 | End: 2024-05-28

## 2024-05-28 RX ORDER — ONDANSETRON 2 MG/ML
INJECTION INTRAMUSCULAR; INTRAVENOUS AS NEEDED
Status: DISCONTINUED | OUTPATIENT
Start: 2024-05-28 | End: 2024-05-28

## 2024-05-28 RX ORDER — MIDAZOLAM HYDROCHLORIDE 2 MG/2ML
INJECTION, SOLUTION INTRAMUSCULAR; INTRAVENOUS AS NEEDED
Status: DISCONTINUED | OUTPATIENT
Start: 2024-05-28 | End: 2024-05-28

## 2024-05-28 RX ORDER — MAGNESIUM HYDROXIDE 1200 MG/15ML
LIQUID ORAL AS NEEDED
Status: DISCONTINUED | OUTPATIENT
Start: 2024-05-28 | End: 2024-05-28 | Stop reason: HOSPADM

## 2024-05-28 RX ORDER — HYDROCODONE BITARTRATE AND ACETAMINOPHEN 5; 325 MG/1; MG/1
1-2 TABLET ORAL EVERY 6 HOURS PRN
Qty: 12 TABLET | Refills: 0 | Status: SHIPPED | OUTPATIENT
Start: 2024-05-28 | End: 2024-06-07

## 2024-05-28 RX ORDER — LIDOCAINE HYDROCHLORIDE 20 MG/ML
INJECTION, SOLUTION EPIDURAL; INFILTRATION; INTRACAUDAL; PERINEURAL AS NEEDED
Status: DISCONTINUED | OUTPATIENT
Start: 2024-05-28 | End: 2024-05-28

## 2024-05-28 RX ORDER — ONDANSETRON 2 MG/ML
4 INJECTION INTRAMUSCULAR; INTRAVENOUS ONCE AS NEEDED
Status: DISCONTINUED | OUTPATIENT
Start: 2024-05-28 | End: 2024-05-28 | Stop reason: HOSPADM

## 2024-05-28 RX ORDER — HYDROCODONE BITARTRATE AND ACETAMINOPHEN 5; 325 MG/1; MG/1
1 TABLET ORAL ONCE
Status: COMPLETED | OUTPATIENT
Start: 2024-05-28 | End: 2024-05-28

## 2024-05-28 RX ORDER — ROCURONIUM BROMIDE 10 MG/ML
INJECTION, SOLUTION INTRAVENOUS AS NEEDED
Status: DISCONTINUED | OUTPATIENT
Start: 2024-05-28 | End: 2024-05-28

## 2024-05-28 RX ORDER — FENTANYL CITRATE 50 UG/ML
INJECTION, SOLUTION INTRAMUSCULAR; INTRAVENOUS AS NEEDED
Status: DISCONTINUED | OUTPATIENT
Start: 2024-05-28 | End: 2024-05-28

## 2024-05-28 RX ORDER — DEXAMETHASONE SODIUM PHOSPHATE 10 MG/ML
INJECTION, SOLUTION INTRAMUSCULAR; INTRAVENOUS AS NEEDED
Status: DISCONTINUED | OUTPATIENT
Start: 2024-05-28 | End: 2024-05-28

## 2024-05-28 RX ORDER — SODIUM CHLORIDE 9 MG/ML
125 INJECTION, SOLUTION INTRAVENOUS CONTINUOUS
Status: DISCONTINUED | OUTPATIENT
Start: 2024-05-28 | End: 2024-05-28 | Stop reason: HOSPADM

## 2024-05-28 RX ORDER — SODIUM CHLORIDE 9 MG/ML
INJECTION, SOLUTION INTRAVENOUS CONTINUOUS PRN
Status: DISCONTINUED | OUTPATIENT
Start: 2024-05-28 | End: 2024-05-28

## 2024-05-28 RX ORDER — CEFUROXIME AXETIL 250 MG/1
250 TABLET ORAL EVERY 12 HOURS SCHEDULED
Qty: 14 TABLET | Refills: 0 | Status: SHIPPED | OUTPATIENT
Start: 2024-05-28 | End: 2024-06-04

## 2024-05-28 RX ORDER — TROSPIUM CHLORIDE 20 MG/1
20 TABLET, FILM COATED ORAL 2 TIMES DAILY PRN
Qty: 180 TABLET | Refills: 1 | Status: SHIPPED | OUTPATIENT
Start: 2024-05-28

## 2024-05-28 RX ORDER — GLYCOPYRROLATE 0.2 MG/ML
INJECTION INTRAMUSCULAR; INTRAVENOUS AS NEEDED
Status: DISCONTINUED | OUTPATIENT
Start: 2024-05-28 | End: 2024-05-28

## 2024-05-28 RX ORDER — FENTANYL CITRATE/PF 50 MCG/ML
25 SYRINGE (ML) INJECTION
Status: DISCONTINUED | OUTPATIENT
Start: 2024-05-28 | End: 2024-05-28 | Stop reason: HOSPADM

## 2024-05-28 RX ADMIN — ROCURONIUM BROMIDE 50 MG: 50 INJECTION, SOLUTION INTRAVENOUS at 12:27

## 2024-05-28 RX ADMIN — FENTANYL CITRATE 50 MCG: 50 INJECTION INTRAMUSCULAR; INTRAVENOUS at 12:55

## 2024-05-28 RX ADMIN — MIDAZOLAM HYDROCHLORIDE 2 MG: 1 INJECTION, SOLUTION INTRAMUSCULAR; INTRAVENOUS at 12:16

## 2024-05-28 RX ADMIN — SODIUM CHLORIDE: 0.9 INJECTION, SOLUTION INTRAVENOUS at 13:08

## 2024-05-28 RX ADMIN — ONDANSETRON 4 MG: 2 INJECTION INTRAMUSCULAR; INTRAVENOUS at 13:40

## 2024-05-28 RX ADMIN — FENTANYL CITRATE 50 MCG: 50 INJECTION INTRAMUSCULAR; INTRAVENOUS at 12:32

## 2024-05-28 RX ADMIN — LIDOCAINE HYDROCHLORIDE 80 MG: 20 INJECTION, SOLUTION EPIDURAL; INFILTRATION; INTRACAUDAL at 12:27

## 2024-05-28 RX ADMIN — DEXAMETHASONE SODIUM PHOSPHATE 10 MG: 10 INJECTION INTRAMUSCULAR; INTRAVENOUS at 12:39

## 2024-05-28 RX ADMIN — SODIUM CHLORIDE: 0.9 INJECTION, SOLUTION INTRAVENOUS at 12:01

## 2024-05-28 RX ADMIN — FENTANYL CITRATE 100 MCG: 50 INJECTION INTRAMUSCULAR; INTRAVENOUS at 12:27

## 2024-05-28 RX ADMIN — SUGAMMADEX 400 MG: 100 INJECTION, SOLUTION INTRAVENOUS at 13:40

## 2024-05-28 RX ADMIN — GLYCOPYRROLATE 0.2 MCG: 0.2 INJECTION, SOLUTION INTRAMUSCULAR; INTRAVENOUS at 12:38

## 2024-05-28 RX ADMIN — ONDANSETRON 4 MG: 2 INJECTION INTRAMUSCULAR; INTRAVENOUS at 12:16

## 2024-05-28 RX ADMIN — IOHEXOL 5.5 ML: 300 INJECTION, SOLUTION INTRAVENOUS at 12:35

## 2024-05-28 RX ADMIN — Medication 3000 MG: at 12:29

## 2024-05-28 RX ADMIN — PROPOFOL 200 MG: 10 INJECTION, EMULSION INTRAVENOUS at 12:27

## 2024-05-28 RX ADMIN — SODIUM CHLORIDE 125 ML/HR: 0.9 INJECTION, SOLUTION INTRAVENOUS at 09:51

## 2024-05-28 RX ADMIN — HYDROCODONE BITARTRATE AND ACETAMINOPHEN 1 TABLET: 5; 325 TABLET ORAL at 15:26

## 2024-05-28 NOTE — H&P
HISTORY AND PHYSICAL  ?  ?  Patient Name: Ramu Rubio  Patient MRN: [id number]  Attending Provider: Dallas Purcell MD  Service: Urology  Chief Complaint    Left renal calculi    HPI   Ramu Rubio is a 69 y.o. male with left renal calculi  I plan cystoscopy, left ureteroscopy laser stone stent replacement.  Potential risks and complications discussed, and informed consent was given by the patient.  Medications  Meds/Allergies   No current facility-administered medications for this encounter.      Cannot display prior to admission medications because the patient has not been admitted in this contact.     No current facility-administered medications for this encounter.    Current Outpatient Medications:     acetaminophen (TYLENOL) 500 mg tablet, Take 1,000 mg by mouth every 6 (six) hours as needed, Disp: , Rfl:     amLODIPine (NORVASC) 10 mg tablet, Take 10 mg by mouth daily, Disp: , Rfl:     ascorbic acid (VITAMIN C) 250 mg tablet, Take 250 mg by mouth daily, Disp: , Rfl:     aspirin (ASPIRIN 81) 81 mg chewable tablet, Chew 81 mg daily, Disp: , Rfl:     aspirin-acetaminophen-caffeine (EXCEDRIN MIGRAINE) 250-250-65 MG per tablet, Take 2 tablets by mouth every 6 (six) hours as needed, Disp: , Rfl:     Calcium Carbonate (CALCIUM 500 PO), Take 1 capsule by mouth in the morning, Disp: , Rfl:     folic acid (FOLVITE) 800 MCG tablet, Take 800 mcg by mouth daily, Disp: , Rfl:     hydrochlorothiazide (HYDRODIURIL) 25 mg tablet, Take 25 mg by mouth daily, Disp: , Rfl:     Ibuprofen-Acetaminophen (ADVIL DUAL ACTION PO), Take 2 tablets by mouth if needed, Disp: , Rfl:     Lactobacillus (ACIDOPHILUS PO), Take 1 tablet by mouth in the morning, Disp: , Rfl:     losartan (COZAAR) 100 MG tablet, Take 50 mg by mouth daily 100 mg at bedtime, Disp: , Rfl:     Magnesium 100 MG CAPS, Take 250 mg by mouth 2 (two) times a day, Disp: , Rfl:     Melatonin ER 10 MG TBCR, Take 15 mg by mouth daily at bedtime, Disp: , Rfl:     Omega-3  Fatty Acids (FISH OIL PO), Take 1 g by mouth in the morning, Disp: , Rfl:     phenazopyridine (PYRIDIUM) 100 mg tablet, Take 1 tablet (100 mg total) by mouth 3 (three) times a day as needed for bladder spasms, Disp: 20 tablet, Rfl: 0    phenazopyridine (PYRIDIUM) 200 mg tablet, Take 1 tablet (200 mg total) by mouth 3 (three) times a day as needed for bladder spasms, Disp: 30 tablet, Rfl: 1    tamsulosin (FLOMAX) 0.4 mg, Take 1 capsule (0.4 mg total) by mouth daily with dinner, Disp: 90 capsule, Rfl: 3    traMADol (ULTRAM) 50 mg tablet, Take 1 tablet (50 mg total) by mouth every 6 (six) hours as needed for moderate pain (Patient taking differently: Take 100 mg by mouth every 6 (six) hours as needed for moderate pain), Disp: 20 tablet, Rfl: 0    trospium chloride (SANCTURA) 20 mg tablet, Take 1 tablet (20 mg total) by mouth 2 (two) times a day as needed (frequency), Disp: 30 tablet, Rfl: 1    VITAMIN D, ERGOCALCIFEROL, PO, Take 5,000 Units by mouth 3 (three) times a day, Disp: , Rfl:     B Complex Vitamins (VITAMIN B COMPLEX PO), Take 1 capsule by mouth in the morning (Patient not taking: Reported on 5/16/2024), Disp: , Rfl:     escitalopram (LEXAPRO) 5 mg tablet, Take 5 mg by mouth daily at bedtime, Disp: , Rfl:     saccharomyces boulardii (Florastor) 250 mg capsule, Take 250 mg by mouth daily, Disp: , Rfl:     traMADol (ULTRAM) 50 mg tablet, Take 50 mg by mouth every 6 (six) hours as needed for moderate pain, Disp: , Rfl:   Review of Systems  10 point review of systems negative except as noted in HPI  Allergies  Allergies   Allergen Reactions    Zolpidem Other (See Comments)      pt also reports sleep driving, pt became unreponsive    Butalbital-Aspirin-Caffeine Other (See Comments)     coma     PMH  Past Medical History:   Diagnosis Date    Chronic pain     History of transfusion     needed 16 units of blood per pt after bariatric surgery    Hypertension     Insomnia     Iron (Fe) deficiency anemia     Kidney stone      Spinal stenosis     neck    Stroke (HCC) 2010     Past surgical history  Past Surgical History:   Procedure Laterality Date    BLADDER SURGERY      COLONOSCOPY      ESOPHAGOGASTRODUODENOSCOPY N/A 07/11/2018    Procedure: ESOPHAGOGASTRODUODENOSCOPY (EGD);  Surgeon: Chet Serrato MD;  Location: [facility];  Service: Gastroenterology    FL RETROGRADE PYELOGRAM  4/22/2024    GASTRIC BYPASS      KNEE SURGERY Left     NECK SURGERY      [place] CYSTO/URETERO W/LITHOTRIPSY &INDWELL STENT INSRT Left 4/22/2024    Procedure: CYSTO USCOPE W/ LASER, &  STENT;  Surgeon: Darrius Vo MD;  Location: [facility];  Service: Urology    PROSTATE SURGERY      TONSILLECTOMY       Social history  Social History     Tobacco Use    Smoking status: Never    Smokeless tobacco: Never   Vaping Use    Vaping status: Never Used   Substance Use Topics    Alcohol use: Not Currently     Comment: rarely    Drug use: No     ?  Physical Exam    .vs  General appearance: alert and oriented, in no acute distress  Head: Normocephalic, without obvious abnormality, atraumatic  Throat: lips, mucosa, and tongue normal; teeth and gums normal  Neck: no adenopathy, no carotid bruit, no JVD, supple, symmetrical, trachea midline, and thyroid not enlarged, symmetric, no tenderness/mass/nodules  Lungs: clear to auscultation bilaterally  Heart: regular rate and rhythm, S1, S2 normal, no murmur, click, rub or gallop  Abdomen: soft, non-tender; bowel sounds normal; no masses,  no organomegaly  Extremities: extremities normal, warm and well-perfused; no cyanosis, clubbing, or edema  Dallas Purcell MD

## 2024-05-28 NOTE — DISCHARGE SUMMARY
Discharge Summary - Ramu Rubio 69 y.o. male MRN: [id number]    Admission Date: 5/28/2024     Admitting Diagnosis: Renal stone [N20.0]    Procedures Performed: left ureteroscopy, laser stone, stent    Patient underwent planned outpt surgery and recovered without complication. Discharged in good condition.  Medications were prescribed.  Pt knows to have office follow-up at the appropriate time.

## 2024-05-28 NOTE — TELEPHONE ENCOUNTER
----- Message from Dallas Purcell MD sent at 5/28/2024  2:20 PM EDT -----   Laser stone, stent today.  Stent out with string next Wed or Thursday

## 2024-05-28 NOTE — OP NOTE
OPERATIVE REPORT  PATIENT NAME: Ramu Rubio    :  1955  MRN: [id number]  Pt Location: [facility] 03    SURGERY DATE: 2024    Surgeons and Role:     * Dallas Purcell MD - Primary    Preop Diagnosis:  Renal stone [N20.0]    Post-Op Diagnosis Codes:     * Renal stone [N20.0]    Procedure(s):  Left - CYSTO USCOPE W/LASER.  &  STENT  CVAC  CVAC device used to aspirate stone particles.     Specimen(s):  ID Type Source Tests Collected by Time Destination   A :  Urine Urine, Cystoscopic URINE CULTURE Dallas Purcell MD 2024 12:41 PM        Estimated Blood Loss:   Minimal    Drains:  Ureteral Internal Stent Left ureter 6 Fr. (Active)   Number of days: 36       Ureteral Internal Stent Left ureter 6 Fr. (Active)   Site Assessment Clean;Intact 24 1357   Securement Method Other (Comment) 24 1357   Number of days: 0       Anesthesia Type:   General    Operative Indications:  Renal stone [N20.0]      Operative Findings:  Several stones, 2 - 7 mm in lower pole calyces.  Fragmented well with laser.      Complications:   None    Procedure and Technique:      PLAN FOR STENT: Remove by nurse with string in 7-8 days      The patient was brought into the OR, properly identified and positioned on the table.  General anesthesia was induced, and they were placed in lithotomy position and prepped and draped using chlorhexidine solution.    The 22F scope was introduced in the urethra and bladder.  Other findings upon placement of the scope included large prostate;  bladder inflammation from stent..    The left ureteral stent was grasped and withdrawn.    Two wires were placed, and an access sheath was placed over one wire.   The flexible scope was placed thru a sheath and advanced to stone in lower and middle calyces.. The Holmium laser was used on various settings to break up stone into small particles. Care was taken not to laser tissue.     The CVAC device was used to aspirate out stone particles.    All  otherparticles appeared small enough to pass around the stent.     The scope was removed from the ureter, and the cystoscope was used to place a 6F Contour VL stent in the ureter, with the upper coils in the renal pelvis, and the distal coil in the bladder. Retrograde pyelogram on that side confirmed good position and no extravasation of contrast.   The patient was awaken from anesthesia and taken to the PACU in good condition.    I was present for the entire procedure.    Patient Disposition:  PACU         SIGNATURE: Dallas Purcell MD  DATE: May 28, 2024  TIME: 2:15 PM

## 2024-05-28 NOTE — DISCHARGE INSTR - AVS FIRST PAGE
ALL YOUR  PREVIOUS MEDS ARE THE SAME.    NEW MEDS: hydrocodone for pain.    Cefuroxime antigbiotic, twice per day for 7 days    BE CAREFUL NOT TO PULL THE STRING.    EXPECT SOME BLOOD IN URINE, BURNING, FREQUENT URINATION.    ACTIVITY:  RESUME FULL ACTIVITY after stent is out.

## 2024-05-28 NOTE — ANESTHESIA PREPROCEDURE EVALUATION
Procedure:  CYSTO USCOPE W/LASER,  &  STENT  PSB CVAC (Left: Bladder)    Relevant Problems   GI/HEPATIC   (+) H/O bariatric surgery      NEURO/PSYCH   (+) CVA (cerebral vascular accident) (HCC)      Other   (+) Morbidly obese (HCC)        Physical Exam    Airway    Mallampati score: III  TM Distance: >3 FB       Dental   No notable dental hx     Cardiovascular  Cardiovascular exam normal    Pulmonary  Pulmonary exam normal     Other Findings        Anesthesia Plan  ASA Score- 3     Anesthesia Type- general with ASA Monitors.         Additional Monitors:     Airway Plan: ETT.           Plan Factors-Exercise tolerance (METS): >4 METS.    Chart reviewed.        Patient is not a current smoker.  Patient instructed to abstain from smoking on day of procedure. Patient did not smoke on day of surgery.            Induction- intravenous.    Postoperative Plan- Plan for postoperative opioid use. Planned trial extubation        Informed Consent- Anesthetic plan and risks discussed with patient.

## 2024-05-28 NOTE — ANESTHESIA POSTPROCEDURE EVALUATION
Post-Op Assessment Note    CV Status:  Stable  Pain Score: 0    Pain management: adequate       Mental Status:  Alert   Hydration Status:  Stable   PONV Controlled:  None   Airway Patency:  Patent  Airway: intubated  There is a medical reason for not screening for obstructive sleep apnea and/or for not using two or more mitigation strategies   Post Op Vitals Reviewed: Yes    No anethesia notable event occurred.    Staff: CRNA               BP   170/86   Temp      Pulse  98   Resp   16   SpO2   95

## 2024-05-29 ENCOUNTER — TELEPHONE (OUTPATIENT)
Dept: UROLOGY | Facility: MEDICAL CENTER | Age: 69
End: 2024-05-29

## 2024-05-29 DIAGNOSIS — N20.0 KIDNEY STONE: Primary | ICD-10-CM

## 2024-05-29 NOTE — TELEPHONE ENCOUNTER
Post Op Note    Ramu Rubio is a 69 y.o. male s/p CYSTO USCOPE W/LASER,  &  STENT  CVAC (Left: Bladder) performed 5/28/24 with Dr. Purcell.      How would you rate your pain on a scale from 1 to 10, 10 being the worst pain ever? 5  Have you had a fever? No  Have your bowel movements been regular? Yes taking stool softeners  Do you have any difficulty urinating? No  If the patient has an incision- do you have any redness around the incision or any drainage from the incision? No incision   If the patient has a drain- are you having any issues with the drain? No drain   If the patient has a carlson- are you comfortable caring for your carlson? No carlson   Do you have any other questions or concerns that I can address at this time?     Pt states he is doing OK  He states he is taking Vicodin for pain management.  Advised he should only take for extreme pain and he should take Tylenol or ibuprofen.  Pt is comfortable removing his own stent.  Gave instructions on stent removal.  Will call pt to ensure it was removed intact.  He will be contacted for an appt in 2 months with [facility] kidney panel prior.  Instructions will be mailed to pt.  He will contact the office with any issues or concerns.

## 2024-05-29 NOTE — TELEPHONE ENCOUNTER
Called patient and scheduled appointment; Critical access hospital information and script mailed to patient.

## 2024-05-29 NOTE — TELEPHONE ENCOUNTER
Patient returning call states he was speaking with the nurse today about post-procedure stent removal on string and the nurse was going to check with Dr. Purcell if he has a stent on a sting or not and get back to him today.

## 2024-05-30 LAB — BACTERIA UR CULT: NORMAL

## 2024-06-05 NOTE — TELEPHONE ENCOUNTER
Spoke to pt who states he removed his own stent intact last evening.  He will follow up on 7/29/24 with lithlink kidney panel.

## 2024-06-21 LAB
AMMONIA UR-SCNC: 80 MMOL/24 HR (ref 15–60)
CA H2 PHOS DIHYD 24H SATFR UR: 0.1 (ref 0.5–2)
CALCIUM 24H UR-MRATE: 127 MG/24 HR
CALCIUM/CREAT UR: 54 MG/G CREAT (ref 34–196)
CALCIUM/KG BODY WEIGHT: 0.9 MG/24 HR/KG
CAOX INDEX 24H UR-RTO: 6.8 (ref 6–10)
CHLORIDE 24H UR-SRATE: 365 MMOL/24 HR (ref 70–250)
CITRATE 24H UR-MCNC: <38 MG/24 HR
COMMENT: ABNORMAL
CREAT UR-MCNC: 2344 MG/24 HR
CREAT/BW 24H UR-RELMRAT: 17.5 MG/24 HR/KG (ref 11.9–24.4)
CYSTINE 24H UR-MCNC: ABNORMAL MG/DL
MAGNESIUM 24H UR-MRATE: 267 MG/24 HR (ref 30–120)
OXALATE UR-MCNC: 130 MG/24 HR (ref 20–40)
PH 24H UR: 5.35 [PH] (ref 5.8–6.2)
PHOSPHATE 24H UR-MRATE: 1033 MG/24 HR (ref 600–1200)
POTASSIUM 24H UR-SCNC: 61 MMOL/24 HR (ref 20–100)
PROTEIN CATABOLIC RATE 24H UR-MRATE: 0.8 G/KG/24 HR (ref 0.8–1.4)
SODIUM 24H UR-SRATE: 340 MMOL/24 HR (ref 50–150)
SPECIMEN VOL 24H UR: 2550 ML/24 HR (ref 500–4000)
SULFATE 24H UR-SRATE: 28 MEQ/24 HR (ref 20–80)
URATE 24H SATFR UR: 1.49
URATE 24H UR-MRATE: 697 MG/24 HR
UUN 24H UR-MRATE: 13.08 G/24 HR (ref 6–14)

## 2024-07-29 ENCOUNTER — OFFICE VISIT (OUTPATIENT)
Dept: UROLOGY | Facility: MEDICAL CENTER | Age: 69
End: 2024-07-29
Payer: MEDICARE

## 2024-07-29 VITALS
OXYGEN SATURATION: 94 % | SYSTOLIC BLOOD PRESSURE: 144 MMHG | DIASTOLIC BLOOD PRESSURE: 80 MMHG | HEIGHT: 69 IN | HEART RATE: 85 BPM | WEIGHT: 292 LBS | BODY MASS INDEX: 43.25 KG/M2

## 2024-07-29 DIAGNOSIS — R82.992 HYPEROXALURIA: ICD-10-CM

## 2024-07-29 DIAGNOSIS — N20.0 KIDNEY STONE: Primary | ICD-10-CM

## 2024-07-29 PROCEDURE — 1124F ACP DISCUSS-NO DSCNMKR DOCD: CPT | Performed by: UROLOGY

## 2024-07-29 PROCEDURE — 99214 OFFICE O/P EST MOD 30 MIN: CPT | Performed by: UROLOGY

## 2024-07-29 PROCEDURE — 82360 CALCULUS ASSAY QUANT: CPT | Performed by: UROLOGY

## 2024-07-29 RX ORDER — POTASSIUM CITRATE 10 MEQ/1
TABLET, EXTENDED RELEASE ORAL
Qty: 360 TABLET | Refills: 3 | Status: SHIPPED | OUTPATIENT
Start: 2024-07-29

## 2024-07-29 NOTE — PROGRESS NOTES
"   HISTORY:    1.  Doing well after procedures February and May 2024:  Had 2 cm lower pole stone, after second procedure he passed numerous stones will be analyzed    24 urine:  Good volume of 2.5 L  Very high oxalate, 130.  Top normal is 20.  Very low citrate, only 38.  It also showed elevated magnesium ammonia, and sodium    He takes calcium supplements, magnesium supplements.         ASSESSMENT / PLAN:    Difficult problem of numerous abnormalities on his 24 urine    He says he must take the magnesium because of leg cramps    We will certainly keep him on the calcium, but sometimes behind the high oxalate in the gut and decrease oxalate in the urine.    I recommend potassium citrate, 20 mEq twice per day    Stone analysis    Nephrology consultation    The following portions of the patient's history were reviewed and updated as appropriate: allergies, current medications, past family history, past medical history, past social history, past surgical history, and problem list.    Review of Systems      Objective:     Physical Exam      No results found for: \"PSA\"]  BUN   Date Value Ref Range Status   04/01/2024 19 5 - 25 mg/dL Final     Creatinine   Date Value Ref Range Status   04/01/2024 1.06 0.60 - 1.30 mg/dL Final     Comment:     Standardized to IDMS reference method     No components found for: \"CBC\"      Patient Active Problem List   Diagnosis    Pes anserinus bursitis of right knee    Morbidly obese (HCC)    H/O bariatric surgery    CVA (cerebral vascular accident) ([facility])        Diagnoses and all orders for this visit:    Kidney stone  -     potassium citrate (UROCIT-K) 10 mEq; 2 tablets twice per day, after meals  -     Ambulatory referral to Nephrology; Future  -     Stone analysis; Future  -     Stone analysis; Future    Hyperoxaluria           Patient ID: Ramu Rubio is a 69 y.o. male.      Current Outpatient Medications:     acetaminophen (TYLENOL) 500 mg tablet, Take 1,000 mg by mouth every 6 " (six) hours as needed, Disp: , Rfl:     amLODIPine (NORVASC) 10 mg tablet, Take 10 mg by mouth daily, Disp: , Rfl:     ascorbic acid (VITAMIN C) 250 mg tablet, Take 250 mg by mouth daily, Disp: , Rfl:     aspirin (ASPIRIN 81) 81 mg chewable tablet, Chew 81 mg daily, Disp: , Rfl:     aspirin-acetaminophen-caffeine (EXCEDRIN MIGRAINE) 250-250-65 MG per tablet, Take 2 tablets by mouth every 6 (six) hours as needed, Disp: , Rfl:     B Complex Vitamins (VITAMIN B COMPLEX PO), Take 1 capsule by mouth in the morning, Disp: , Rfl:     Calcium Carbonate (CALCIUM 500 PO), Take 1 capsule by mouth in the morning, Disp: , Rfl:     escitalopram (LEXAPRO) 5 mg tablet, Take 5 mg by mouth daily at bedtime, Disp: , Rfl:     folic acid (FOLVITE) 800 MCG tablet, Take 800 mcg by mouth daily, Disp: , Rfl:     hydrochlorothiazide (HYDRODIURIL) 25 mg tablet, Take 25 mg by mouth daily, Disp: , Rfl:     Ibuprofen-Acetaminophen (ADVIL DUAL ACTION PO), Take 2 tablets by mouth if needed, Disp: , Rfl:     Lactobacillus (ACIDOPHILUS PO), Take 1 tablet by mouth in the morning, Disp: , Rfl:     losartan (COZAAR) 100 MG tablet, Take 50 mg by mouth daily 100 mg at bedtime, Disp: , Rfl:     Magnesium 100 MG CAPS, Take 250 mg by mouth 2 (two) times a day, Disp: , Rfl:     Melatonin ER 10 MG TBCR, Take 15 mg by mouth daily at bedtime, Disp: , Rfl:     Omega-3 Fatty Acids (FISH OIL PO), Take 1 g by mouth in the morning, Disp: , Rfl:     phenazopyridine (PYRIDIUM) 100 mg tablet, Take 1 tablet (100 mg total) by mouth 3 (three) times a day as needed for bladder spasms, Disp: 20 tablet, Rfl: 0    phenazopyridine (PYRIDIUM) 200 mg tablet, Take 1 tablet (200 mg total) by mouth 3 (three) times a day as needed for bladder spasms, Disp: 30 tablet, Rfl: 1    saccharomyces boulardii (Florastor) 250 mg capsule, Take 250 mg by mouth daily, Disp: , Rfl:     tamsulosin (FLOMAX) 0.4 mg, Take 1 capsule (0.4 mg total) by mouth daily with dinner, Disp: 90 capsule, Rfl: 3     traMADol (ULTRAM) 50 mg tablet, Take 50 mg by mouth every 6 (six) hours as needed for moderate pain, Disp: , Rfl:     traMADol (ULTRAM) 50 mg tablet, Take 1 tablet (50 mg total) by mouth every 6 (six) hours as needed for moderate pain (Patient taking differently: Take 100 mg by mouth every 6 (six) hours as needed for moderate pain), Disp: 20 tablet, Rfl: 0    trospium chloride (SANCTURA) 20 mg tablet, TAKE 1 TABLET (20 MG TOTAL) BY MOUTH 2 (TWO) TIMES A DAY AS NEEDED (FREQUENCY), Disp: 180 tablet, Rfl: 1    VITAMIN D, ERGOCALCIFEROL, PO, Take 5,000 Units by mouth 3 (three) times a day, Disp: , Rfl:     Past Medical History:   Diagnosis Date    Chronic pain     History of transfusion     needed 16 units of blood per pt after bariatric surgery    Hypertension     Insomnia     Iron (Fe) deficiency anemia     Kidney stone     Spinal stenosis     neck    Stroke (HCC) 2010       Past Surgical History:   Procedure Laterality Date    BLADDER SURGERY      COLONOSCOPY      ESOPHAGOGASTRODUODENOSCOPY N/A 07/11/2018    Procedure: ESOPHAGOGASTRODUODENOSCOPY (EGD);  Surgeon: Chet Serrato MD;  [facility]: [facility];  Service: Gastroenterology    FL RETROGRADE PYELOGRAM  4/22/2024    FL RETROGRADE PYELOGRAM  5/28/2024    GASTRIC BYPASS      KNEE SURGERY Left     NECK SURGERY      [place] CYSTO/URETERO W/LITHOTRIPSY &INDWELL STENT INSRT Left 4/22/2024    Procedure: CYSTO USCOPE W/ LASER, &  STENT;  Surgeon: Darrius Vo MD;  [facility];  Service: Urology    [place] CYSTO/URETERO W/LITHOTRIPSY &INDWELL STENT INSRT Left 5/28/2024    Procedure: CYSTO USCOPE W/LASER,  &  STENT  CVAC;  Surgeon: Dallas Purcell MD;  [facility];  Service: Urology    PROSTATE SURGERY      TONSILLECTOMY         Social History

## 2024-08-09 LAB
CALCIUM OXALATE DIHYDRATE MFR STONE IR: 20 %
COLOR STONE: NORMAL
COM MFR STONE: 80 %
COMMENT-STONE3: NORMAL
COMPOSITION: NORMAL
LABORATORY COMMENT REPORT: NORMAL
PHOTO: NORMAL
SIZE STONE: NORMAL MM
SPEC SOURCE SUBJ: NORMAL
STONE ANALYSIS-IMP: NORMAL
WT STONE: 142 MG

## 2024-12-17 ENCOUNTER — CONSULT (OUTPATIENT)
Dept: NEPHROLOGY | Facility: HOSPITAL | Age: 69
End: 2024-12-17
Attending: UROLOGY
Payer: MEDICARE

## 2024-12-17 VITALS
OXYGEN SATURATION: 94 % | SYSTOLIC BLOOD PRESSURE: 140 MMHG | DIASTOLIC BLOOD PRESSURE: 82 MMHG | WEIGHT: 293 LBS | BODY MASS INDEX: 43.4 KG/M2 | HEART RATE: 74 BPM | HEIGHT: 69 IN

## 2024-12-17 DIAGNOSIS — I10 ESSENTIAL HYPERTENSION: ICD-10-CM

## 2024-12-17 DIAGNOSIS — R82.991 HYPOCITRATURIA: ICD-10-CM

## 2024-12-17 DIAGNOSIS — R82.992 HYPEROXALURIA: ICD-10-CM

## 2024-12-17 DIAGNOSIS — N20.0 KIDNEY STONE: Primary | ICD-10-CM

## 2024-12-17 PROCEDURE — 99204 OFFICE O/P NEW MOD 45 MIN: CPT | Performed by: INTERNAL MEDICINE

## 2024-12-17 RX ORDER — POTASSIUM CITRATE 15 MEQ/1
2 TABLET, EXTENDED RELEASE ORAL 2 TIMES DAILY
Qty: 120 TABLET | Refills: 0 | Status: SHIPPED | OUTPATIENT
Start: 2024-12-17

## 2024-12-17 NOTE — PATIENT INSTRUCTIONS
Kidney stone  -of note, last sCr on file 1.06 as of 4/1/24, repeat BMP along with UA, UpCr and microalb:Cr  -stone analysis shows calcium oxalate stone formation  -follows with urology   -last litholink performed June 2024: would recommend decreased magnesium supplementation in setting of high urine magnesium as well as reduction of sodium containing foods in the diet  -would avoid high oxalate diet  -would  recommend starting potassium citrate as very low urine citrate at only 38  -high urine oxalate also noted  -good urine volume noted. Continue HCTZ  -recommend repeat litholink testing in 8 weeks after dietary changes  -recommend avoidance of vitamin C as this can lead to higher oxalate accumulation    -suspect malabsorption in setting of [place] en [place] surgery  -avoid calcium supplements  Taking magnesium for leg cramps, will check magnesium level. Recommend taking magnesium once daily  Hypocitraturia  -previously on potassium citrate 10meq 2 tabs twice daily with meals  -will order 15meq 2 tablets twice daily   -repeat litholink in 8 weeks  Essential hypertension  -BP in office acceptable for age  -avoid high salt/sodium diet: avoid frequent fast food, restaurant food, Chinese food and canned and packaged foods  -avoid caffeine  -continue amlodipine 10mg daily, HCTZ 25mg daily, losartan 50mg daily and 100mg in the evening  -avoid frequent NSAID(ibuprofen) use  Hyperoxaluria  -reduce iced tea intake as tea contains oxalate  -continue HCTZ  -continue hydration  -may containing moderate calcium intake in the diet  Body mass index (BMI) 40.0-44.9, adult (HCC)  -check vitamin D in 2 weeks    RTC in 3 months.  Obtain blood and urine testing in 2 weeks after starting potassium citrate.  Call if you cannot obtain potassium citrate.  Obtain repeat litholink in 8 weeks after starting potassium citrate.

## 2024-12-17 NOTE — ASSESSMENT & PLAN NOTE
-of note, last sCr on file 1.06 as of 4/1/24, repeat BMP along with UA, UpCr and microalb:Cr  -stone analysis shows calcium oxalate stone formation  -follows with urology   -last litholink performed June 2024: would recommend decreased magnesium supplementation in setting of high urine magnesium as well as reduction of sodium containing foods in the diet  -would avoid high oxalate diet  -would  recommend starting potassium citrate as very low urine citrate at only 38  -high urine oxalate also noted  -good urine volume noted. Continue HCTZ  -recommend repeat litholink testing in 8 weeks after dietary changes  -recommend avoidance of vitamin C as this can lead to higher oxalate accumulation    -suspect malabsorption in setting of Pavan en Y surgery  -avoid calcium supplements  Taking magnesium for leg cramps, will check magnesium level. Recommend taking magnesium once daily

## 2024-12-17 NOTE — ASSESSMENT & PLAN NOTE
-previously on potassium citrate 10meq 2 tabs twice daily with meals  -will order 15meq 2 tablets twice daily   -repeat litholink in 8 weeks
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
## 2025-01-09 DIAGNOSIS — N20.0 KIDNEY STONE: ICD-10-CM

## 2025-01-09 DIAGNOSIS — R82.991 HYPOCITRATURIA: ICD-10-CM

## 2025-01-10 RX ORDER — POTASSIUM CITRATE 15 MEQ/1
2 TABLET, EXTENDED RELEASE ORAL 2 TIMES DAILY
Qty: 360 TABLET | Refills: 1 | Status: SHIPPED | OUTPATIENT
Start: 2025-01-10

## 2025-01-13 ENCOUNTER — APPOINTMENT (OUTPATIENT)
Dept: LAB | Facility: HOSPITAL | Age: 70
End: 2025-01-13
Payer: MEDICARE

## 2025-01-13 DIAGNOSIS — N20.0 KIDNEY STONE: ICD-10-CM

## 2025-01-13 LAB
25(OH)D3 SERPL-MCNC: 37.1 NG/ML (ref 30–100)
AMORPH URATE CRY URNS QL MICRO: ABNORMAL
ANION GAP SERPL CALCULATED.3IONS-SCNC: 8 MMOL/L (ref 4–13)
BACTERIA UR QL AUTO: ABNORMAL /HPF
BILIRUB UR QL STRIP: NEGATIVE
BUN SERPL-MCNC: 23 MG/DL (ref 5–25)
CALCIUM SERPL-MCNC: 8.5 MG/DL (ref 8.4–10.2)
CHLORIDE SERPL-SCNC: 104 MMOL/L (ref 96–108)
CLARITY UR: CLEAR
CO2 SERPL-SCNC: 27 MMOL/L (ref 21–32)
COLOR UR: YELLOW
CREAT SERPL-MCNC: 1.35 MG/DL (ref 0.6–1.3)
CREAT UR-MCNC: 135.1 MG/DL
CREAT UR-MCNC: 135.3 MG/DL
GFR SERPL CREATININE-BSD FRML MDRD: 53 ML/MIN/1.73SQ M
GLUCOSE SERPL-MCNC: 145 MG/DL (ref 65–140)
GLUCOSE UR STRIP-MCNC: NEGATIVE MG/DL
HGB UR QL STRIP.AUTO: NEGATIVE
HYALINE CASTS #/AREA URNS LPF: ABNORMAL /LPF
KETONES UR STRIP-MCNC: NEGATIVE MG/DL
LEUKOCYTE ESTERASE UR QL STRIP: NEGATIVE
MAGNESIUM SERPL-MCNC: 1.9 MG/DL (ref 1.9–2.7)
MICROALBUMIN UR-MCNC: 80 MG/L
MICROALBUMIN/CREAT 24H UR: 59 MG/G CREATININE (ref 0–30)
MUCOUS THREADS UR QL AUTO: ABNORMAL
NITRITE UR QL STRIP: NEGATIVE
NON-SQ EPI CELLS URNS QL MICRO: ABNORMAL /HPF
PH UR STRIP.AUTO: 5.5 [PH]
PHOSPHATE SERPL-MCNC: 2.8 MG/DL (ref 2.3–4.1)
POTASSIUM SERPL-SCNC: 4.4 MMOL/L (ref 3.5–5.3)
PROT UR STRIP-MCNC: ABNORMAL MG/DL
PROT UR-MCNC: 27.4 MG/DL
PROT/CREAT UR: 0.2 MG/G{CREAT} (ref 0–0.1)
PTH-INTACT SERPL-MCNC: 148.5 PG/ML (ref 12–88)
RBC #/AREA URNS AUTO: ABNORMAL /HPF
SODIUM SERPL-SCNC: 139 MMOL/L (ref 135–147)
SP GR UR STRIP.AUTO: 1.02 (ref 1–1.03)
URATE SERPL-MCNC: 5.9 MG/DL (ref 3.5–8.5)
UROBILINOGEN UR STRIP-ACNC: <2 MG/DL
WBC #/AREA URNS AUTO: ABNORMAL /HPF

## 2025-01-13 PROCEDURE — 36415 COLL VENOUS BLD VENIPUNCTURE: CPT

## 2025-01-13 PROCEDURE — 82570 ASSAY OF URINE CREATININE: CPT

## 2025-01-13 PROCEDURE — 84550 ASSAY OF BLOOD/URIC ACID: CPT

## 2025-01-13 PROCEDURE — 82306 VITAMIN D 25 HYDROXY: CPT

## 2025-01-13 PROCEDURE — 80048 BASIC METABOLIC PNL TOTAL CA: CPT

## 2025-01-13 PROCEDURE — 83970 ASSAY OF PARATHORMONE: CPT

## 2025-01-13 PROCEDURE — 84100 ASSAY OF PHOSPHORUS: CPT

## 2025-01-13 PROCEDURE — 82043 UR ALBUMIN QUANTITATIVE: CPT

## 2025-01-13 PROCEDURE — 81001 URINALYSIS AUTO W/SCOPE: CPT

## 2025-01-13 PROCEDURE — 83735 ASSAY OF MAGNESIUM: CPT

## 2025-01-13 PROCEDURE — 84156 ASSAY OF PROTEIN URINE: CPT

## 2025-01-14 ENCOUNTER — RESULTS FOLLOW-UP (OUTPATIENT)
Dept: NEPHROLOGY | Facility: HOSPITAL | Age: 70
End: 2025-01-14

## 2025-01-14 DIAGNOSIS — N20.0 KIDNEY STONE: Primary | ICD-10-CM

## 2025-01-14 NOTE — TELEPHONE ENCOUNTER
----- Message from Jaimie Dominique DO sent at 1/14/2025 12:00 PM EST -----  sCr a bit higher. Recommend repeat BMP in 2 weeks with increased hydration, thanks.

## 2025-01-14 NOTE — TELEPHONE ENCOUNTER
Placed call to pt and advised to increase hydration and repeat BMP in 2 weeks - pt stated hydrates a lot but will have repeat labs in 2 weeks - pt was in process of completing litholink needed to drop off specimen to the hospital lab - once receive results to discuss with pt

## 2025-01-15 ENCOUNTER — APPOINTMENT (OUTPATIENT)
Dept: LAB | Facility: HOSPITAL | Age: 70
End: 2025-01-15
Attending: UROLOGY
Payer: MEDICARE

## 2025-01-15 PROCEDURE — 84105 ASSAY OF URINE PHOSPHORUS: CPT

## 2025-01-15 PROCEDURE — 84300 ASSAY OF URINE SODIUM: CPT

## 2025-01-15 PROCEDURE — 84560 ASSAY OF URINE/URIC ACID: CPT

## 2025-01-15 PROCEDURE — 82140 ASSAY OF AMMONIA: CPT

## 2025-01-15 PROCEDURE — 83945 ASSAY OF OXALATE: CPT

## 2025-01-15 PROCEDURE — 83935 ASSAY OF URINE OSMOLALITY: CPT

## 2025-01-15 PROCEDURE — 81003 URINALYSIS AUTO W/O SCOPE: CPT

## 2025-01-15 PROCEDURE — 84392 ASSAY OF URINE SULFATE: CPT

## 2025-01-15 PROCEDURE — 82436 ASSAY OF URINE CHLORIDE: CPT

## 2025-01-15 PROCEDURE — 84540 ASSAY OF URINE/UREA-N: CPT

## 2025-01-15 PROCEDURE — 83986 ASSAY PH BODY FLUID NOS: CPT

## 2025-01-15 PROCEDURE — 84133 ASSAY OF URINE POTASSIUM: CPT

## 2025-01-15 PROCEDURE — 82131 AMINO ACIDS SINGLE QUANT: CPT

## 2025-01-15 PROCEDURE — 83735 ASSAY OF MAGNESIUM: CPT

## 2025-01-15 PROCEDURE — 82570 ASSAY OF URINE CREATININE: CPT

## 2025-01-15 PROCEDURE — 82507 ASSAY OF CITRATE: CPT

## 2025-01-15 PROCEDURE — 81050 URINALYSIS VOLUME MEASURE: CPT

## 2025-01-15 PROCEDURE — 82340 ASSAY OF CALCIUM IN URINE: CPT

## 2025-01-22 LAB
COMMENT: NORMAL
MICRODELETION SYND BLD/T FISH: NORMAL

## 2025-03-27 ENCOUNTER — TELEPHONE (OUTPATIENT)
Dept: NEPHROLOGY | Facility: CLINIC | Age: 70
End: 2025-03-27

## 2025-03-27 NOTE — TELEPHONE ENCOUNTER
Placed call to pt and advised of lab work to have completed prior to upcoming office visit - pt advised will have lab work done tomorrow

## 2025-03-28 ENCOUNTER — APPOINTMENT (OUTPATIENT)
Dept: LAB | Facility: HOSPITAL | Age: 70
End: 2025-03-28
Payer: MEDICARE

## 2025-03-28 DIAGNOSIS — N20.0 KIDNEY STONE: ICD-10-CM

## 2025-03-28 DIAGNOSIS — E66.01 MORBIDLY OBESE (HCC): ICD-10-CM

## 2025-03-28 DIAGNOSIS — E29.1: ICD-10-CM

## 2025-03-28 LAB
25(OH)D3 SERPL-MCNC: 40.1 NG/ML (ref 30–100)
ALBUMIN SERPL BCG-MCNC: 4 G/DL (ref 3.5–5)
ALP SERPL-CCNC: 80 U/L (ref 34–104)
ALT SERPL W P-5'-P-CCNC: 14 U/L (ref 7–52)
ANION GAP SERPL CALCULATED.3IONS-SCNC: 7 MMOL/L (ref 4–13)
AST SERPL W P-5'-P-CCNC: 12 U/L (ref 13–39)
BACTERIA UR QL AUTO: ABNORMAL /HPF
BASOPHILS # BLD AUTO: 0.03 THOUSANDS/ÂΜL (ref 0–0.1)
BASOPHILS NFR BLD AUTO: 1 % (ref 0–1)
BILIRUB SERPL-MCNC: 0.9 MG/DL (ref 0.2–1)
BILIRUB UR QL STRIP: NEGATIVE
BUN SERPL-MCNC: 18 MG/DL (ref 5–25)
CALCIUM SERPL-MCNC: 8.2 MG/DL (ref 8.4–10.2)
CHLORIDE SERPL-SCNC: 104 MMOL/L (ref 96–108)
CLARITY UR: CLEAR
CO2 SERPL-SCNC: 27 MMOL/L (ref 21–32)
COLOR UR: YELLOW
CREAT SERPL-MCNC: 0.97 MG/DL (ref 0.6–1.3)
CREAT UR-MCNC: 131.5 MG/DL
EOSINOPHIL # BLD AUTO: 0.11 THOUSAND/ÂΜL (ref 0–0.61)
EOSINOPHIL NFR BLD AUTO: 2 % (ref 0–6)
ERYTHROCYTE [DISTWIDTH] IN BLOOD BY AUTOMATED COUNT: 16 % (ref 11.6–15.1)
ESTRADIOL SERPL-MCNC: 22.9 PG/ML (ref 0–33.1)
FERRITIN SERPL-MCNC: 5 NG/ML (ref 24–336)
FOLATE SERPL-MCNC: 13.9 NG/ML
FSH SERPL-ACNC: 16.6 MIU/ML (ref 1.3–19.3)
GFR SERPL CREATININE-BSD FRML MDRD: 78 ML/MIN/1.73SQ M
GLUCOSE SERPL-MCNC: 106 MG/DL (ref 65–140)
GLUCOSE UR STRIP-MCNC: NEGATIVE MG/DL
GRAN CASTS #/AREA URNS LPF: ABNORMAL /[LPF]
HCT VFR BLD AUTO: 38 % (ref 36.5–49.3)
HGB BLD-MCNC: 11.3 G/DL (ref 12–17)
HGB UR QL STRIP.AUTO: NEGATIVE
HYALINE CASTS #/AREA URNS LPF: ABNORMAL /LPF
IMM GRANULOCYTES # BLD AUTO: 0.04 THOUSAND/UL (ref 0–0.2)
IMM GRANULOCYTES NFR BLD AUTO: 1 % (ref 0–2)
KETONES UR STRIP-MCNC: NEGATIVE MG/DL
LEUKOCYTE ESTERASE UR QL STRIP: NEGATIVE
LH SERPL-ACNC: 12.5 MIU/ML (ref 1.2–8.6)
LYMPHOCYTES # BLD AUTO: 1.06 THOUSANDS/ÂΜL (ref 0.6–4.47)
LYMPHOCYTES NFR BLD AUTO: 16 % (ref 14–44)
MCH RBC QN AUTO: 25 PG (ref 26.8–34.3)
MCHC RBC AUTO-ENTMCNC: 29.7 G/DL (ref 31.4–37.4)
MCV RBC AUTO: 84 FL (ref 82–98)
MICROALBUMIN UR-MCNC: 130 MG/L
MICROALBUMIN/CREAT 24H UR: 99 MG/G CREATININE (ref 0–30)
MONOCYTES # BLD AUTO: 0.47 THOUSAND/ÂΜL (ref 0.17–1.22)
MONOCYTES NFR BLD AUTO: 7 % (ref 4–12)
MUCOUS THREADS UR QL AUTO: ABNORMAL
NEUTROPHILS # BLD AUTO: 4.74 THOUSANDS/ÂΜL (ref 1.85–7.62)
NEUTS SEG NFR BLD AUTO: 73 % (ref 43–75)
NITRITE UR QL STRIP: NEGATIVE
NON-SQ EPI CELLS URNS QL MICRO: ABNORMAL /HPF
NRBC BLD AUTO-RTO: 0 /100 WBCS
PH UR STRIP.AUTO: 5.5 [PH]
PLATELET # BLD AUTO: 302 THOUSANDS/UL (ref 149–390)
PMV BLD AUTO: 8.5 FL (ref 8.9–12.7)
POTASSIUM SERPL-SCNC: 4.1 MMOL/L (ref 3.5–5.3)
PROT SERPL-MCNC: 6.6 G/DL (ref 6.4–8.4)
PROT UR STRIP-MCNC: ABNORMAL MG/DL
PSA SERPL-MCNC: 2.16 NG/ML (ref 0–4)
RBC # BLD AUTO: 4.52 MILLION/UL (ref 3.88–5.62)
RBC #/AREA URNS AUTO: ABNORMAL /HPF
SODIUM SERPL-SCNC: 138 MMOL/L (ref 135–147)
SP GR UR STRIP.AUTO: >=1.03 (ref 1–1.03)
T4 FREE SERPL-MCNC: 0.9 NG/DL (ref 0.61–1.12)
TSH SERPL DL<=0.05 MIU/L-ACNC: 0.56 UIU/ML (ref 0.45–4.5)
UROBILINOGEN UR STRIP-ACNC: <2 MG/DL
VIT B12 SERPL-MCNC: 424 PG/ML (ref 180–914)
WBC # BLD AUTO: 6.45 THOUSAND/UL (ref 4.31–10.16)
WBC #/AREA URNS AUTO: ABNORMAL /HPF

## 2025-03-28 PROCEDURE — 82607 VITAMIN B-12: CPT

## 2025-03-28 PROCEDURE — 85025 COMPLETE CBC W/AUTO DIFF WBC: CPT

## 2025-03-28 PROCEDURE — 84443 ASSAY THYROID STIM HORMONE: CPT

## 2025-03-28 PROCEDURE — 83002 ASSAY OF GONADOTROPIN (LH): CPT

## 2025-03-28 PROCEDURE — 82570 ASSAY OF URINE CREATININE: CPT

## 2025-03-28 PROCEDURE — 82728 ASSAY OF FERRITIN: CPT

## 2025-03-28 PROCEDURE — 82746 ASSAY OF FOLIC ACID SERUM: CPT

## 2025-03-28 PROCEDURE — 80053 COMPREHEN METABOLIC PANEL: CPT

## 2025-03-28 PROCEDURE — 84439 ASSAY OF FREE THYROXINE: CPT

## 2025-03-28 PROCEDURE — 81001 URINALYSIS AUTO W/SCOPE: CPT

## 2025-03-28 PROCEDURE — 82306 VITAMIN D 25 HYDROXY: CPT

## 2025-03-28 PROCEDURE — 82043 UR ALBUMIN QUANTITATIVE: CPT

## 2025-03-28 PROCEDURE — 82670 ASSAY OF TOTAL ESTRADIOL: CPT

## 2025-03-28 PROCEDURE — G0103 PSA SCREENING: HCPCS

## 2025-03-28 PROCEDURE — 36415 COLL VENOUS BLD VENIPUNCTURE: CPT

## 2025-03-28 PROCEDURE — 84270 ASSAY OF SEX HORMONE GLOBUL: CPT

## 2025-03-28 PROCEDURE — 83001 ASSAY OF GONADOTROPIN (FSH): CPT

## 2025-03-29 ENCOUNTER — APPOINTMENT (OUTPATIENT)
Dept: LAB | Facility: HOSPITAL | Age: 70
End: 2025-03-29
Payer: MEDICARE

## 2025-03-29 DIAGNOSIS — E66.01 MORBIDLY OBESE (HCC): ICD-10-CM

## 2025-03-29 DIAGNOSIS — E29.1: ICD-10-CM

## 2025-03-29 LAB — SHBG SERPL-SCNC: 29.4 NMOL/L (ref 19.3–76.4)

## 2025-03-29 PROCEDURE — 84402 ASSAY OF FREE TESTOSTERONE: CPT

## 2025-03-29 PROCEDURE — 36415 COLL VENOUS BLD VENIPUNCTURE: CPT

## 2025-03-29 PROCEDURE — 84403 ASSAY OF TOTAL TESTOSTERONE: CPT

## 2025-03-31 LAB
TESTOST FREE SERPL-MCNC: 5.4 PG/ML (ref 6.6–18.1)
TESTOST SERPL-MCNC: 280 NG/DL (ref 264–916)

## 2025-04-04 ENCOUNTER — OFFICE VISIT (OUTPATIENT)
Dept: NEPHROLOGY | Facility: HOSPITAL | Age: 70
End: 2025-04-04

## 2025-04-04 VITALS
HEIGHT: 69 IN | WEIGHT: 303 LBS | DIASTOLIC BLOOD PRESSURE: 88 MMHG | SYSTOLIC BLOOD PRESSURE: 132 MMHG | BODY MASS INDEX: 44.88 KG/M2

## 2025-04-04 DIAGNOSIS — I10 PRIMARY HYPERTENSION: ICD-10-CM

## 2025-04-04 DIAGNOSIS — R82.991 HYPOCITRATURIA: ICD-10-CM

## 2025-04-04 DIAGNOSIS — N20.0 KIDNEY STONE: Primary | ICD-10-CM

## 2025-04-04 RX ORDER — CLONIDINE HYDROCHLORIDE 0.1 MG/1
1 TABLET ORAL 2 TIMES DAILY
COMMUNITY
Start: 2025-03-27

## 2025-04-04 RX ORDER — BUPRENORPHINE 5 UG/H
PATCH TRANSDERMAL
COMMUNITY
Start: 2025-03-28

## 2025-04-04 NOTE — PATIENT INSTRUCTIONS
Kidney stone  -off HCTZ per PCP  -on K citrate  -of note, last sCr on file 0.97 as of 3/28/25, repeat BMP along with UA, UpCr and microalb:Cr prior to next office visit  -stone analysis shows calcium oxalate stone formation  -follows with urology   -last litholink performed June 2024: would recommend decreased magnesium supplementation in setting of high urine magnesium as well as reduction of sodium containing foods in the diet  -would avoid high oxalate diet  -would  recommend starting potassium citrate as very low urine citrate at only 38  -high urine oxalate also noted  -good urine volume noted.   -recommend repeat litholink testing in 3-4 weeks after dietary changes  -recommend avoidance of vitamin C as this can lead to higher oxalate accumulation    -suspect malabsorption in setting of Pavan en Y surgery  -avoid calcium supplements  Taking magnesium for leg cramps, will check magnesium level. Recommend taking magnesium once daily  Hypocitraturia  -cannot tolerate potassium citrate 30meq twice daily  -drinking lemonade  -decreased to 15meq twice daily  -repeat litholink  Primary hypertension  -BP at goal, continue amlodipine 10mg daily, clonidine 0.1mg twice daily,  losartan 50mg daily  -off HCTZ 25mg daily for 1 week  -avoid high salt/sodium diet  Body mass index (BMI) 40.0-44.9, adult (HCC)  -encourage weight loss/exercise    RTC in 1 year.  Obtain blood and urine testing in 6 months.  Repeat litholink in 4 weeks.  Obtain kidney ultrasound d/t back pain, r/o stones.

## 2025-04-04 NOTE — PROGRESS NOTES
NEPHROLOGY OUTPATIENT PROGRESS NOTE   Ramu Rubio 70 y.o. male MRN: [id number]  DATE: 4/4/2025  Reason for visit:   Chief Complaint   Patient presents with    Follow-up    Nephrolithiasis        Patient Instructions   Kidney stone  -off HCTZ per PCP  -on K citrate  -of note, last sCr on file 0.97 as of 3/28/25, repeat BMP along with UA, UpCr and microalb:Cr prior to next office visit  -stone analysis shows calcium oxalate stone formation  -follows with urology   -last litholink performed June 2024: would recommend decreased magnesium supplementation in setting of high urine magnesium as well as reduction of sodium containing foods in the diet  -would avoid high oxalate diet  -would  recommend starting potassium citrate as very low urine citrate at only 38  -high urine oxalate also noted  -good urine volume noted.   -recommend repeat litholink testing in 3-4 weeks after dietary changes  -recommend avoidance of vitamin C as this can lead to higher oxalate accumulation    -suspect malabsorption in setting of Pavan en Y surgery  -avoid calcium supplements  Taking magnesium for leg cramps, will check magnesium level. Recommend taking magnesium once daily  Hypocitraturia  -cannot tolerate potassium citrate 30meq twice daily  -drinking lemonade  -decreased to 15meq twice daily  -repeat litholink  Primary hypertension  -BP at goal, continue amlodipine 10mg daily, clonidine 0.1mg twice daily,  losartan 50mg daily  -off HCTZ 25mg daily for 1 week  -avoid high salt/sodium diet  Body mass index (BMI) 40.0-44.9, adult (HCC)  -encourage weight loss/exercise    RTC in 1 year.  Obtain blood and urine testing in 6 months.  Repeat litholink in 4 weeks.  Obtain kidney ultrasound d/t back pain, r/o stones.      Assessment & Plan  Kidney stone  -off HCTZ per PCP  -on K citrate  -of note, last sCr on file 0.97 as of 3/28/25, repeat BMP along with UA, UpCr and microalb:Cr prior to next office visit  -stone analysis shows calcium  oxalate stone formation  -follows with urology   -last litholink performed June 2024: would recommend decreased magnesium supplementation in setting of high urine magnesium as well as reduction of sodium containing foods in the diet  -would avoid high oxalate diet  -would  recommend starting potassium citrate as very low urine citrate at only 38  -high urine oxalate also noted  -good urine volume noted.   -recommend repeat litholink testing in 3-4 weeks after dietary changes  -recommend avoidance of vitamin C as this can lead to higher oxalate accumulation    -suspect malabsorption in setting of Pavan en Y surgery  -avoid calcium supplements  Taking magnesium for leg cramps, will check magnesium level. Recommend taking magnesium once daily  Hypocitraturia  -cannot tolerate potassium citrate 30meq twice daily  -drinking lemonade  -decreased to 15meq twice daily  -repeat litholink  Primary hypertension  -BP at goal, continue amlodipine 10mg daily, clonidine 0.1mg twice daily,  losartan 50mg daily  -off HCTZ 25mg daily for 1 week  -avoid high salt/sodium diet  Body mass index (BMI) 40.0-44.9, adult (HCC)  -encourage weight loss/exercise    RTC in 1 year.  Obtain blood and urine testing in 6 months.    SUBJECTIVE / INTERVAL HISTORY:  70 y.o. male presents in follow up of kidney stones.     Ramu Rubio denies any recent illness/hospitalizations/medication changes since last office visit.    Denies NSAID use.  Still has back pain. No further stones.   Stomach bothers him with increased citrus fruits and twice daily potassium chloride for a few weeks.     Review of Systems   Constitutional:  Negative for chills and fever.   HENT:  Negative for sore throat.    Eyes:  Negative for visual disturbance.   Respiratory:  Negative for cough and shortness of breath.    Cardiovascular:  Negative for chest pain and leg swelling.   Gastrointestinal:  Negative for abdominal pain, constipation, diarrhea, nausea and vomiting.  "  Endocrine: Negative for polyuria.   Genitourinary:  Negative for decreased urine volume, difficulty urinating, dysuria and hematuria.   Musculoskeletal:  Positive for back pain. Negative for myalgias.   Skin:  Negative for rash.   Neurological:  Negative for dizziness, light-headedness and numbness.   Psychiatric/Behavioral:  Negative for confusion.        OBJECTIVE:  /88 (BP Location: Right arm, Patient Position: Sitting, Cuff Size: Large)   Ht 5' 9\" (1.753 m)   Wt (!) 137 kg (303 lb)   BMI 44.75 kg/m²  Body mass index is 44.75 kg/m².    Physical exam:  Physical Exam  Vitals reviewed.   Constitutional:       General: He is not in acute distress.     Appearance: Normal appearance. He is well-developed. He is obese. He is not diaphoretic.   HENT:      Head: Normocephalic and atraumatic.      Nose: Nose normal.      Mouth/Throat:      Mouth: Mucous membranes are moist.      Pharynx: No oropharyngeal exudate.   Eyes:      General: No scleral icterus.        Right eye: No discharge.         Left eye: No discharge.   Neck:      Thyroid: No thyromegaly.   Cardiovascular:      Rate and Rhythm: Normal rate and regular rhythm.      Heart sounds: Normal heart sounds.   Pulmonary:      Effort: Pulmonary effort is normal.      Breath sounds: Normal breath sounds. No wheezing or rales.   Abdominal:      General: Bowel sounds are normal. There is no distension.      Palpations: Abdomen is soft.      Tenderness: There is no abdominal tenderness.   Musculoskeletal:         General: Swelling (b/l LE) present. Normal range of motion.      Cervical back: Neck supple.   Lymphadenopathy:      Cervical: No cervical adenopathy.   Skin:     General: Skin is warm and dry.      Coloration: Skin is not jaundiced.      Findings: No rash.   Neurological:      General: No focal deficit present.      Mental Status: He is alert.      Comments: awake   Psychiatric:         Mood and Affect: Mood normal.         Behavior: Behavior normal. "         Medications:    Current Outpatient Medications:     acetaminophen (TYLENOL) 500 mg tablet, Take 1,000 mg by mouth every 6 (six) hours as needed, Disp: , Rfl:     amLODIPine (NORVASC) 10 mg tablet, Take 10 mg by mouth daily, Disp: , Rfl:     aspirin (ASPIRIN 81) 81 mg chewable tablet, Chew 81 mg daily, Disp: , Rfl:     aspirin-acetaminophen-caffeine (EXCEDRIN MIGRAINE) 250-250-65 MG per tablet, Take 2 tablets by mouth every 6 (six) hours as needed, Disp: , Rfl:     cloNIDine (CATAPRES) 0.1 mg tablet, Take 1 tablet by mouth 2 (two) times a day, Disp: , Rfl:     escitalopram (LEXAPRO) 5 mg tablet, Take 5 mg by mouth daily at bedtime, Disp: , Rfl:     hydrochlorothiazide (HYDRODIURIL) 25 mg tablet, Take 25 mg by mouth daily, Disp: , Rfl:     Lactobacillus (ACIDOPHILUS PO), Take 1 tablet by mouth in the morning, Disp: , Rfl:     losartan (COZAAR) 100 MG tablet, Take 50 mg by mouth daily 100 mg at bedtime, Disp: , Rfl:     Magnesium 100 MG CAPS, Take 250 mg by mouth 2 (two) times a day, Disp: , Rfl:     Melatonin ER 10 MG TBCR, Take 15 mg by mouth daily at bedtime, Disp: , Rfl:     Omega-3 Fatty Acids (FISH OIL PO), Take 1 g by mouth in the morning, Disp: , Rfl:     traMADol (ULTRAM) 50 mg tablet, Take 1 tablet (50 mg total) by mouth every 6 (six) hours as needed for moderate pain (Patient taking differently: Take 100 mg by mouth every 6 (six) hours as needed for moderate pain), Disp: 20 tablet, Rfl: 0    transdermal buprenorphine (BUTRANS) 5 mcg/hr TD patch, APPLY 1 PATCH ON THE SKIN WEEKLY, Disp: , Rfl:     trospium chloride (SANCTURA) 20 mg tablet, TAKE 1 TABLET (20 MG TOTAL) BY MOUTH 2 (TWO) TIMES A DAY AS NEEDED (FREQUENCY), Disp: 180 tablet, Rfl: 1    B Complex Vitamins (VITAMIN B COMPLEX PO), Take 1 capsule by mouth in the morning (Patient not taking: Reported on 12/17/2024), Disp: , Rfl:     folic acid (FOLVITE) 800 MCG tablet, Take 800 mcg by mouth daily (Patient not taking: Reported on 4/4/2025), Disp: ,  Rfl:     Ibuprofen-Acetaminophen (ADVIL DUAL ACTION PO), Take 2 tablets by mouth if needed (Patient not taking: No sig reported), Disp: , Rfl:     phenazopyridine (PYRIDIUM) 100 mg tablet, Take 1 tablet (100 mg total) by mouth 3 (three) times a day as needed for bladder spasms (Patient not taking: Reported on 7/29/2024), Disp: 20 tablet, Rfl: 0    phenazopyridine (PYRIDIUM) 200 mg tablet, Take 1 tablet (200 mg total) by mouth 3 (three) times a day as needed for bladder spasms (Patient not taking: Reported on 7/29/2024), Disp: 30 tablet, Rfl: 1    Potassium Citrate ER 15 MEQ (1620 MG) TBCR, TAKE 2 TABLETS BY MOUTH TWICE A DAY, Disp: 360 tablet, Rfl: 1    saccharomyces boulardii (Florastor) 250 mg capsule, Take 250 mg by mouth daily (Patient not taking: Reported on 7/29/2024), Disp: , Rfl:     tamsulosin (FLOMAX) 0.4 mg, Take 1 capsule (0.4 mg total) by mouth daily with dinner (Patient not taking: Reported on 4/4/2025), Disp: 90 capsule, Rfl: 3    traMADol (ULTRAM) 50 mg tablet, Take 50 mg by mouth every 6 (six) hours as needed for moderate pain (Patient not taking: Reported on 12/17/2024), Disp: , Rfl:     Allergies:  Allergies as of 04/04/2025 - Reviewed 04/04/2025   Allergen Reaction Noted    Zolpidem Other (See Comments) 12/13/2012    Butalbital-aspirin-caffeine Other (See Comments) 08/13/2008       The following portions of the patient's history were reviewed and updated as appropriate: past family history, past surgical history and problem list.    Laboratory Results:  Lab Results   Component Value Date    SODIUM 138 03/28/2025    K 4.1 03/28/2025     03/28/2025    CO2 27 03/28/2025    BUN 18 03/28/2025    CREATININE 0.97 03/28/2025    GLUC 106 03/28/2025    CALCIUM 8.2 (L) 03/28/2025        Lab Results   Component Value Date    .5 (H) 01/13/2025    CALCIUM 8.2 (L) 03/28/2025    PHOS 2.8 01/13/2025       Portions of the record may have been created with voice recognition software.  Occasional wrong  "word or \"sound a like\" substitutions may have occurred due to the inherent limitations of voice recognition software.  Read the chart carefully and recognize, using context, where substitutions have occurred.  "

## 2025-04-04 NOTE — ASSESSMENT & PLAN NOTE
-BP at goal, continue amlodipine 10mg daily, clonidine 0.1mg twice daily,  losartan 50mg daily  -off HCTZ 25mg daily for 1 week  -avoid high salt/sodium diet

## 2025-04-04 NOTE — ASSESSMENT & PLAN NOTE
-cannot tolerate potassium citrate 30meq twice daily  -drinking lemonade  -decreased to 15meq twice daily  -repeat litholink

## 2025-04-04 NOTE — ASSESSMENT & PLAN NOTE
-off HCTZ per PCP  -on K citrate  -of note, last sCr on file 0.97 as of 3/28/25, repeat BMP along with UA, UpCr and microalb:Cr prior to next office visit  -stone analysis shows calcium oxalate stone formation  -follows with urology   -last litholink performed June 2024: would recommend decreased magnesium supplementation in setting of high urine magnesium as well as reduction of sodium containing foods in the diet  -would avoid high oxalate diet  -would  recommend starting potassium citrate as very low urine citrate at only 38  -high urine oxalate also noted  -good urine volume noted.   -recommend repeat litholink testing in 3-4 weeks after dietary changes  -recommend avoidance of vitamin C as this can lead to higher oxalate accumulation    -suspect malabsorption in setting of Pavan en Y surgery  -avoid calcium supplements  Taking magnesium for leg cramps, will check magnesium level. Recommend taking magnesium once daily

## 2025-07-17 ENCOUNTER — E-CONSULT (OUTPATIENT)
Age: 70
End: 2025-07-17
Payer: MEDICARE

## 2025-07-17 DIAGNOSIS — D50.9 IRON DEFICIENCY ANEMIA, UNSPECIFIED IRON DEFICIENCY ANEMIA TYPE: Primary | ICD-10-CM

## 2025-07-17 PROCEDURE — 99451 NTRPROF PH1/NTRNET/EHR 5/>: CPT | Performed by: NURSE PRACTITIONER

## 2025-07-17 NOTE — PROGRESS NOTES
E-Consult  Ramu Rubio 70 y.o. male MRN: [id number]  Encounter Date: 07/17/25      Reason for Consult / Principal Problem: Patient interested in iron infusions    Consulting Provider: RYAN Cano    Requesting Provider: Shoenberger, Douglas *       ASSESSMENT:    Component      Latest Ref Rng 4/1/2024 3/28/2025   WBC      4.31 - 10.16 Thousand/uL 8.46  6.45    RBC      3.88 - 5.62 Million/uL 4.65  4.52    Hemoglobin      12.0 - 17.0 g/dL 14.1  11.3 (L)    Hematocrit      36.5 - 49.3 % 43.6  38.0    MCV      82 - 98 fL 94  84    MCH      26.8 - 34.3 pg 30.3  25.0 (L)    MCHC      31.4 - 37.4 g/dL 32.3  29.7 (L)    RDW      11.6 - 15.1 % 13.2  16.0 (H)    MPV      8.9 - 12.7 fL 8.9  8.5 (L)    Platelet Count      149 - 390 Thousands/uL 230  302    nRBC      /100 WBCs 0  0    Segmented %      43 - 75 % 83 (H)  73    Immature Grans %      0 - 2 % 0  1    Lymphocytes %      14 - 44 % 11 (L)  16    Monocytes %      4 - 12 % 5  7    Eosinophils %      0 - 6 % 0  2    Basophils %      0 - 1 % 1  1    Absolute Neutrophils      1.85 - 7.62 Thousands/µL 7.10  4.74    Absolute Immature Grans      0.00 - 0.20 Thousand/uL 0.03  0.04    Absolute Lymphocytes      0.60 - 4.47 Thousands/µL 0.89  1.06    Absolute Monocytes      0.17 - 1.22 Thousand/µL 0.38  0.47    Absolute Eosinophils      0.00 - 0.61 Thousand/µL 0.02  0.11    Absolute Basophils      0.00 - 0.10 Thousands/µL 0.04  0.03        Component  Ref Range & Units (hover) 3/28/25  2:45 PM   Ferritin 5 Low      Colonoscopy 2019 with recommended 5-year recall due to history of colon polyps    RECOMMENDATIONS:  Patient is a 70-year-old gentleman with new onset anemia and iron deficiency given low serum ferritin of 5 seen on blood work done in March 2025  Recommend workup for acute anemia and iron deficiency.  He should see GI for repeat colonoscopy    Most recent blood work is 4 months old  Insurance requires labs within 30 days to approve iron  "infusions    Recommend obtaining updated CBC and iron panel.  If updated iron panel continues to reflect iron deficiency would proceed with iron infusions    Would recommend utilizing PCP for iron Venofer plan that has been created within [facility]    The simplest way to do this, would be to place an order for [facility] anemia panel.  This will give you several options to choose from.  Select iron replacement therapy.  This will take you to the therapy plans within [facility] and allow you to search for \" PCP Venofer\" this is an iron infusion plan specifically built for the PCP to order iron infusions.    Once these orders are placed, message would need to be sent to oncology infusion  to help patient get scheduled for infusions.  Please reach out if you require assistance with this process    I would recommend repeating CBC and iron panel 3 months after your patient completes iron infusions to assess how he responded to parenteral iron replacement.      Total time spent >5 min, >50% time spent reviewing/analyzing record, written report will be generated in the EMR. .  "

## 2025-07-23 ENCOUNTER — TELEPHONE (OUTPATIENT)
Age: 70
End: 2025-07-23

## 2025-07-23 NOTE — TELEPHONE ENCOUNTER
Patient called stating he has appointment on 07/28/25 for his yearly follow up  in regards to kidney stones.  He stated his nephrologist order an us kidney/bladder which he did not scheduled.  Number for CS provided and connected to schedule Ultrasound.  He will call back if he needs to r/s appointment if not able to get appointment this week. He was having some pain and wants to make sure no kidney stones.

## 2025-07-25 ENCOUNTER — HOSPITAL ENCOUNTER (OUTPATIENT)
Dept: ULTRASOUND IMAGING | Facility: HOSPITAL | Age: 70
End: 2025-07-25
Attending: INTERNAL MEDICINE
Payer: MEDICARE

## 2025-07-25 DIAGNOSIS — N20.0 KIDNEY STONE: ICD-10-CM

## 2025-07-25 PROCEDURE — 76775 US EXAM ABDO BACK WALL LIM: CPT

## 2025-08-05 ENCOUNTER — TELEPHONE (OUTPATIENT)
Dept: OTHER | Facility: OTHER | Age: 70
End: 2025-08-05

## (undated) DEVICE — FIBER STD QUANTA 200 MICRON

## (undated) DEVICE — SINGLE-USE DIGITAL FLEXIBLE URETEROSCOPE: Brand: APTRA

## (undated) DEVICE — PACK TUR

## (undated) DEVICE — SCD SEQUENTIAL COMPRESSION COMFORT SLEEVE MEDIUM KNEE LENGTH: Brand: KENDALL SCD

## (undated) DEVICE — PREMIUM DRY TRAY LF: Brand: MEDLINE INDUSTRIES, INC.

## (undated) DEVICE — GUIDEWIRE STRGHT TIP 0.035 IN  SOLO PLUS

## (undated) DEVICE — ENDOSCOPIC VALVE WITH ADAPTER.: Brand: SURSEAL® II

## (undated) DEVICE — CVAC ASPIRATION SYSTEM

## (undated) DEVICE — TUBING SUCTION 5MM X 12 FT

## (undated) DEVICE — INVIEW CLEAR LEGGINGS: Brand: CONVERTORS

## (undated) DEVICE — GLOVE SRG BIOGEL 8

## (undated) DEVICE — GLOVE SRG BIOGEL 7.5

## (undated) DEVICE — EXIDINE 4 PCT

## (undated) DEVICE — SHEATH URETERAL ACCESS 10/12FR 45CM PROXIS

## (undated) DEVICE — 3M™ STERI-STRIP™ COMPOUND BENZOIN TINCTURE 40 BAGS/CARTON 4 CARTONS/CASE C1544: Brand: 3M™ STERI-STRIP™

## (undated) DEVICE — URETERAL DUAL LUMEN CATH

## (undated) DEVICE — LUBRICANT JELLY SURGILUBE TUBE 2OZ FLIP TOP

## (undated) DEVICE — SPECIMEN CONTAINER STERILE PEEL PACK

## (undated) DEVICE — UROLOGIC DRAIN BAG: Brand: UNBRANDED

## (undated) DEVICE — FIBER STD QUANTA 272 MICRON

## (undated) DEVICE — SINGLE PORT MANIFOLD: Brand: NEPTUNE 2

## (undated) DEVICE — SYRINGE 20ML LL

## (undated) DEVICE — CATH URET .038 10FR 50CM DUAL LUMEN

## (undated) DEVICE — CATH URETERAL 5FR X 70 CM FLEX TIP POLYUR BARD